# Patient Record
Sex: MALE | Race: WHITE | NOT HISPANIC OR LATINO | Employment: FULL TIME | URBAN - METROPOLITAN AREA
[De-identification: names, ages, dates, MRNs, and addresses within clinical notes are randomized per-mention and may not be internally consistent; named-entity substitution may affect disease eponyms.]

---

## 2017-02-06 ENCOUNTER — MYC MEDICAL ADVICE (OUTPATIENT)
Dept: FAMILY MEDICINE | Facility: CLINIC | Age: 57
End: 2017-02-06

## 2017-02-06 NOTE — TELEPHONE ENCOUNTER
From: Reid Monaco  To: Caro Tinoco PA  Sent: 2/6/2017 9:27 AM CST  Subject: Bradycardia 03/27/2014    Hello     Looking at my Current Health Summary, bradycardia appears as of March 27, 2014. I suspect this is a mistake. I've never been told of this diagnoses and have no history of bradycardia to my knowledge.     My wife, Evelina, does have arrythmia, so it makes me suspect her diagnoses was put on my chart.     Can you please tell me how to resolve this?     Thank you,  Carlton Monaco  [phone]

## 2017-02-08 ENCOUNTER — OFFICE VISIT (OUTPATIENT)
Dept: FAMILY MEDICINE | Facility: CLINIC | Age: 57
End: 2017-02-08

## 2017-02-08 VITALS
HEART RATE: 54 BPM | TEMPERATURE: 97.8 F | WEIGHT: 177 LBS | HEIGHT: 69 IN | BODY MASS INDEX: 26.22 KG/M2 | RESPIRATION RATE: 16 BRPM | SYSTOLIC BLOOD PRESSURE: 104 MMHG | DIASTOLIC BLOOD PRESSURE: 68 MMHG | OXYGEN SATURATION: 99 %

## 2017-02-08 DIAGNOSIS — Z11.59 NEED FOR HEPATITIS C SCREENING TEST: Primary | ICD-10-CM

## 2017-02-08 PROCEDURE — 36415 COLL VENOUS BLD VENIPUNCTURE: CPT | Performed by: FAMILY MEDICINE

## 2017-02-08 PROCEDURE — 99212 OFFICE O/P EST SF 10 MIN: CPT | Performed by: FAMILY MEDICINE

## 2017-02-08 PROCEDURE — 86803 HEPATITIS C AB TEST: CPT | Mod: 90 | Performed by: FAMILY MEDICINE

## 2017-02-08 NOTE — NURSING NOTE
Reid Monaco is here today for his Hepatitis C Screening per Emma.    Pre-Visit Screening :  Immunizations : up to date (Declines Flu Shot)  Colon Screening : is up to date  Asthma Action Test/Plan : NA  PHQ9/GAD7 :  NA  BP done on the right arm, with a large sized cuff.  Pulse - regular  My Chart - accepts    CLASSIFICATION OF OVERWEIGHT AND OBESITY BY BMI                         Obesity Class           BMI(kg/m2)  Underweight                                    < 18.5  Normal                                         18.5-24.9  Overweight                                     25.0-29.9  OBESITY                     I                  30.0-34.9                              II                 35.0-39.9  EXTREME OBESITY             III                >40                             Patient's  BMI Body mass index is 26.13 kg/(m^2).  http://hin.nhlbi.nih.gov/menuplanner/menu.cgi  Questioned patient about current smoking habits.  Pt. quit smoking some time ago.  Cinthya Meier, CMA

## 2017-02-08 NOTE — PROGRESS NOTES
SUBJECTIVE: 56 year old male complaining of seeing he has not had a hepatitis C screening. Has been reading about this recommendation.   The patient describes no risk factors for this infection.   The patient denies a history of hepatitis symptoms in the past.   Smoking history: No.   Relevant past medical history: positive for elevated triglycerides well controlled with medications.    OBJECTIVE: The patient appears healthy, alert and no distress.   Spent time reviewing this recommendation, treatment plans, etc.    ASSESSMENT: (Z11.59) Need for hepatitis C screening test  (primary encounter diagnosis)  Plan: Hepatits C antibody ([facility]), VENOUS COLLECTION        Await results.

## 2017-02-09 LAB
HCV AB - QUEST: NORMAL
SIGNAL TO CUT OFF - QUEST: 0.13

## 2017-03-14 DIAGNOSIS — E78.1 HYPERTRIGLYCERIDEMIA: ICD-10-CM

## 2017-03-14 RX ORDER — FENOFIBRATE 160 MG/1
TABLET ORAL
Qty: 30 TABLET | Refills: 0 | Status: SHIPPED | OUTPATIENT
Start: 2017-03-14 | End: 2017-03-27

## 2017-03-14 NOTE — TELEPHONE ENCOUNTER
Pending Prescriptions:                       Disp   Refills    fenofibrate 160 MG tablet [Pharmacy Med N*30 tab*0            Sig: TAKE 1 TABLET BY MOUTH ONCE DAILY    Pt is due for fasting yearly ov  Please fax 30 and send to [facility]  [phone] (home)

## 2017-03-27 ENCOUNTER — OFFICE VISIT (OUTPATIENT)
Dept: FAMILY MEDICINE | Facility: CLINIC | Age: 57
End: 2017-03-27

## 2017-03-27 VITALS
OXYGEN SATURATION: 99 % | RESPIRATION RATE: 16 BRPM | HEIGHT: 69 IN | WEIGHT: 174 LBS | DIASTOLIC BLOOD PRESSURE: 72 MMHG | BODY MASS INDEX: 25.77 KG/M2 | TEMPERATURE: 98.2 F | HEART RATE: 76 BPM | SYSTOLIC BLOOD PRESSURE: 112 MMHG

## 2017-03-27 DIAGNOSIS — Z76.0 ENCOUNTER FOR MEDICATION REFILL: ICD-10-CM

## 2017-03-27 DIAGNOSIS — E78.1 HYPERTRIGLYCERIDEMIA: Primary | ICD-10-CM

## 2017-03-27 DIAGNOSIS — K64.8 PROLAPSED HEMORRHOIDS: ICD-10-CM

## 2017-03-27 PROCEDURE — 36415 COLL VENOUS BLD VENIPUNCTURE: CPT | Performed by: FAMILY MEDICINE

## 2017-03-27 PROCEDURE — 80061 LIPID PANEL: CPT | Mod: 90 | Performed by: FAMILY MEDICINE

## 2017-03-27 PROCEDURE — 99214 OFFICE O/P EST MOD 30 MIN: CPT | Performed by: FAMILY MEDICINE

## 2017-03-27 PROCEDURE — 80053 COMPREHEN METABOLIC PANEL: CPT | Mod: 90 | Performed by: FAMILY MEDICINE

## 2017-03-27 RX ORDER — FENOFIBRATE 160 MG/1
160 TABLET ORAL DAILY
Qty: 90 TABLET | Refills: 3 | Status: SHIPPED | OUTPATIENT
Start: 2017-03-27 | End: 2018-04-03

## 2017-03-27 NOTE — MR AVS SNAPSHOT
After Visit Summary   3/27/2017    Reid Monaco    MRN: [id number]           Patient Information     Date Of Birth          1960        Visit Information        Provider Department      3/27/2017 7:20 AM Shelly Flores MD [facility]        Today's Diagnoses     Hypertriglyceridemia    -  1    Prolapsed hemorrhoids        Encounter for medication refill          Care Instructions    1)  Medication: continue current medication regimen unchanged  2)  Low fat, low cholesterol diet  3)  Regular aerobic exercise  4)  Recheck in 1 year, sooner should new symptoms or   problems arise.    Patient Education: Reviewed risks of elevated lipids and principles   of treatment.            Follow-ups after your visit        Additional Services     COLORECTAL SURGERY REFERRAL       Your provider has referred you to: [facility] [place] [phone]   [email or web address]    Referral Reason(s): Hemorrhoids  Special Concerns: None  This referral is: Elective (week +)  It is OK to leave a message on patient's voicemail.    Please be aware that coverage of these services is subject to the terms and limitations of your health insurance plan.  Call member services at your health plan with any benefit or coverage questions.      Please bring the following with you to your appointment:    (1) Any X-Rays, CTs or MRIs which have been performed.  Contact the facility where they were done to arrange for  prior to your scheduled appointment.    (2) List of current medications  (3) This referral request   (4) Any documents/labs given to you for this referral                  Follow-up notes from your care team     Return in about 1 year (around 3/27/2018).      Who to contact     If you have questions or need follow up information about today's clinic visit or your schedule please contact [facility] directly at  "[phone].  Normal or non-critical lab and imaging results will be communicated to you by MyChart, letter or phone within 4 business days after the clinic has received the results. If you do not hear from us within 7 days, please contact the clinic through V[employer] or phone. If you have a critical or abnormal lab result, we will notify you by phone as soon as possible.  Submit refill requests through [employer] or call your pharmacy and they will forward the refill request to us. Please allow 3 business days for your refill to be completed.          Additional Information About Your Visit        [facility] Information     [employer] gives you secure access to your electronic health record. If you see a primary care provider, you can also send messages to your care team and make appointments. If you have questions, please call your primary care clinic.  If you do not have a primary care provider, please call [phone] and they will assist you.        Care EveryWhere ID     This is your Care EveryWhere ID. This could be used by other organizations to access your [facility] medical records  [id number]        Your Vitals Were     Pulse Temperature Respirations Height Pulse Oximetry BMI (Body Mass Index)    76 98.2  F (36.8  C) (Oral) 16 1.753 m (5' 9\") 99% 25.7 kg/m2       Blood Pressure from Last 3 Encounters:   03/27/17 112/72   02/08/17 104/68   03/26/16 122/78    Weight from Last 3 Encounters:   03/27/17 78.9 kg (174 lb)   02/08/17 80.3 kg (177 lb)   03/26/16 94.7 kg (208 lb 12.8 oz)              We Performed the Following     COLORECTAL SURGERY REFERRAL     COMPREHENSIVE METABOLIC PANEL     LIPID PANEL     VENIPUNC FNGR,HEEL,EAR [14552]          Today's Medication Changes          These changes are accurate as of: 3/27/17  7:44 AM.  If you have any questions, ask your nurse or doctor.               These medicines have changed or have updated prescriptions.        Dose/Directions    fenofibrate 160 MG tablet   This may " have changed:  See the new instructions.   Used for:  Hypertriglyceridemia   Changed by:  Shelly Flores MD        Dose:  160 mg   Take 1 tablet (160 mg) by mouth daily   Quantity:  90 tablet   Refills:  3            Where to get your medicines      These medications were sent to [id number] - [place], [place] - [address] AT [place] [address]  [address], [place] [address]     Phone:  [phone]     fenofibrate 160 MG tablet                Primary Care Provider Office Phone # Fax #    Shelly Flores [phone]       [facility] [address]  [facility] [address]        Thank you!     Thank you for choosing [facility]  for your care. Our goal is always to provide you with excellent care. Hearing back from our patients is one way we can continue to improve our services. Please take a few minutes to complete the written survey that you may receive in the mail after your visit with us. Thank you!             Your Updated Medication List - Protect others around you: Learn how to safely use, store and throw away your medicines at www.disposemymeds.org.          This list is accurate as of: 3/27/17  7:44 AM.  Always use your most recent med list.                   Brand Name Dispense Instructions for use    aspirin 81 MG tablet      1 TABLET DAILY       fenofibrate 160 MG tablet     90 tablet    Take 1 tablet (160 mg) by mouth daily

## 2017-03-27 NOTE — PROGRESS NOTES
"SUBJECTIVE:  Reid Monaco is an 57 year old male who presents for evaluation of   hyperlipidemia. He has been diagnosed in the past as having   elevated triglycerides only. He indicates that he is feeling well   and denies any symptoms of cardiovascular disease. Specifically   denies chest pain, palpitations, dyspnea, orthopnea, PND,   claudication or peripheral edema. Treatment modalities employed to   this point include diet, regular aerobic exercise and TRICOR. Current medication   regimen is as listed below. Patient denies any side effects of   medication.    Family history: positive for hypertriglyceridemia  Age at diagnosis of hyperlipidemia: 45  Cardiovascular risk factors: family history, lipids and sedentary life style    ROS; longstanding internal hemorrhoidal tissue now prolapsed/ requests referral for removal    Current Outpatient Prescriptions   Medication     fenofibrate 160 MG tablet     ASPIRIN 81 MG OR TABS     No current facility-administered medications for this visit.      No Known Allergies    Social History   Substance Use Topics     Smoking status: Former Smoker     Packs/day: 0.50     Years: 5.00     Smokeless tobacco: Never Used      Comment: quit age 28     Alcohol use 1.0 oz/week     2 drink(s) per week       OBJECTIVE:  /72 (BP Location: Left arm, Patient Position: Chair, Cuff Size: Adult Large)  Pulse 76  Temp 98.2  F (36.8  C) (Oral)  Resp 16  Ht 1.753 m (5' 9\")  Wt 78.9 kg (174 lb)  SpO2 99%  BMI 25.7 kg/m2  Repeat BP R arm seated = 112/72  with regular size cuff.  Skin: negative  Fundi: deferred  Lungs: negative, Percussion normal. Good diaphragmatic excursion. Lungs clear  Heart: negative, PMI normal. No lifts, heaves, or thrills. RRR. No murmurs, clicks gallops or rub  Peripheral pulses: radial=4/4, femoral=4/4, popliteal=4/4, dorsalis pedis=4/4,  Abd; The abdomen is soft without tenderness, guarding, mass or organomegaly. Bowel sounds are normal. No CVA tenderness " or inguinal adenopathy noted.  BMI : Body mass index is 25.7 kg/(m^2).    ASSESSMENT:  (E78.1) Hypertriglyceridemia  (primary encounter diagnosis)  Plan: VENIPUNC FNGR,HEEL,EAR [85794], LIPID PANEL,         COMPREHENSIVE METABOLIC PANEL, fenofibrate 160         MG tablet        1)  Medication: continue current medication regimen unchanged  2)  Low fat, low cholesterol diet  3)  Regular aerobic exercise  4)  Recheck in 1 year, sooner should new symptoms or   problems arise.    Patient Education: Reviewed risks of elevated lipids and principles   of treatment.        (Z76.0) Encounter for medication refill  Plan: VENIPUNC FNGR,HEEL,EAR [14022], LIPID PANEL,         COMPREHENSIVE METABOLIC PANEL            (K64.8) Prolapsed hemorrhoids  Plan: COLORECTAL SURGERY REFERRAL        Schedule consultation

## 2017-03-27 NOTE — PATIENT INSTRUCTIONS
1)  Medication: continue current medication regimen unchanged  2)  Low fat, low cholesterol diet  3)  Regular aerobic exercise  4)  Recheck in 1 year, sooner should new symptoms or   problems arise.    Patient Education: Reviewed risks of elevated lipids and principles   of treatment.

## 2017-03-27 NOTE — NURSING NOTE
Patient is here for a recheck of their medication.  Pre-Visit Screening :  Immunizations : up to date    Colonoscopy : is up to date  Mammogram : na  Asthma Action Test/Plan : na  PHQ9/GAD7 :  na  Pulse - regular    Medication Reconciliation: complete      CLASSIFICATION OF OVERWEIGHT AND OBESITY BY BMI                         Obesity Class           BMI(kg/m2)  Underweight                                    < 18.5  Normal                                         18.5-24.9  Overweight                                     25.0-29.9  OBESITY                     I                  30.0-34.9                              II                 35.0-39.9  EXTREME OBESITY             III                >40                             Patient's  BMI Body mass index is 25.7 kg/(m^2).  http://hin.nhlbi.nih.gov/menuplanner/menu.cgi  Questioned patient about current smoking habits.  Pt. quit smoking some time ago.

## 2017-03-28 LAB
ALBUMIN SERPL-MCNC: 4.3 G/DL (ref 3.6–5.1)
ALBUMIN/GLOB SERPL: 1.7 (CALC) (ref 1–2.5)
ALP SERPL-CCNC: 41 U/L (ref 40–115)
ALT SERPL-CCNC: 15 U/L (ref 9–46)
AST SERPL-CCNC: 18 U/L (ref 10–35)
BILIRUB SERPL-MCNC: 0.5 MG/DL (ref 0.2–1.2)
BUN SERPL-MCNC: 15 MG/DL (ref 7–25)
BUN/CREATININE RATIO: NORMAL (CALC) (ref 6–22)
CALCIUM SERPL-MCNC: 9.3 MG/DL (ref 8.6–10.3)
CHLORIDE SERPLBLD-SCNC: 107 MMOL/L (ref 98–110)
CHOLEST SERPL-MCNC: 139 MG/DL (ref 125–200)
CHOLEST/HDLC SERPL: 2.8 (CALC)
CO2 SERPL-SCNC: 27 MMOL/L (ref 20–31)
CREAT SERPL-MCNC: 0.96 MG/DL (ref 0.7–1.33)
EGFR AFRICAN AMERICAN - QUEST: 101 ML/MIN/1.73M2
GFR SERPL CREATININE-BSD FRML MDRD: 87 ML/MIN/1.73M2
GLOBULIN, CALCULATED - QUEST: 2.5 G/DL (CALC) (ref 1.9–3.7)
GLUCOSE - QUEST: 93 MG/DL (ref 65–99)
HDLC SERPL-MCNC: 49 MG/DL
LDLC SERPL CALC-MCNC: 75 MG/DL (CALC)
NONHDLC SERPL-MCNC: 90 MG/DL (CALC)
POTASSIUM SERPL-SCNC: 4.3 MMOL/L (ref 3.5–5.3)
PROT SERPL-MCNC: 6.8 G/DL (ref 6.1–8.1)
SODIUM SERPL-SCNC: 141 MMOL/L (ref 135–146)
TRIGL SERPL-MCNC: 76 MG/DL

## 2017-04-20 ENCOUNTER — MYC MEDICAL ADVICE (OUTPATIENT)
Dept: FAMILY MEDICINE | Facility: CLINIC | Age: 57
End: 2017-04-20

## 2017-04-20 DIAGNOSIS — Z86.0100 HISTORY OF COLONIC POLYPS: Primary | ICD-10-CM

## 2017-04-20 NOTE — TELEPHONE ENCOUNTER
From: Reid Monaco  To: Shelly Flores MD  Sent: 4/20/2017 10:54 AM CDT  Subject: Medical history    Hello -     I'm just wondering how to get my medical history updated in MyChart?     I had a colonoscopy Nov 5, 2015, that is not shown in my medical history. Can that be added?     The provider was [facility] in [place]. One benign polyp was removed.     Thank you!  Carlton Monaco

## 2017-05-01 ENCOUNTER — HOSPITAL PATHOLOGY (OUTPATIENT)
Dept: OTHER | Facility: CLINIC | Age: 57
End: 2017-05-01

## 2017-05-01 ENCOUNTER — TRANSFERRED RECORDS (OUTPATIENT)
Dept: FAMILY MEDICINE | Facility: CLINIC | Age: 57
End: 2017-05-01

## 2017-05-03 LAB — COPATH REPORT: NORMAL

## 2017-06-19 ENCOUNTER — TRANSFERRED RECORDS (OUTPATIENT)
Dept: FAMILY MEDICINE | Facility: CLINIC | Age: 57
End: 2017-06-19

## 2018-03-30 ENCOUNTER — OFFICE VISIT (OUTPATIENT)
Dept: FAMILY MEDICINE | Facility: CLINIC | Age: 58
End: 2018-03-30
Payer: COMMERCIAL

## 2018-03-30 VITALS
SYSTOLIC BLOOD PRESSURE: 124 MMHG | BODY MASS INDEX: 28.82 KG/M2 | HEIGHT: 69 IN | WEIGHT: 194.6 LBS | OXYGEN SATURATION: 98 % | TEMPERATURE: 98 F | HEART RATE: 81 BPM | DIASTOLIC BLOOD PRESSURE: 74 MMHG

## 2018-03-30 DIAGNOSIS — Z13.1 SCREENING FOR DIABETES MELLITUS: ICD-10-CM

## 2018-03-30 DIAGNOSIS — Z00.01 ENCOUNTER FOR ROUTINE ADULT MEDICAL EXAM WITH ABNORMAL FINDINGS: ICD-10-CM

## 2018-03-30 DIAGNOSIS — Z12.5 SCREENING FOR PROSTATE CANCER: ICD-10-CM

## 2018-03-30 DIAGNOSIS — E78.1 HYPERTRIGLYCERIDEMIA: Primary | ICD-10-CM

## 2018-03-30 DIAGNOSIS — Z23 NEED FOR TDAP VACCINATION: ICD-10-CM

## 2018-03-30 LAB
ALBUMIN SERPL-MCNC: 4.1 G/DL (ref 3.4–5)
ALP SERPL-CCNC: 40 U/L (ref 40–150)
ALT SERPL W P-5'-P-CCNC: 32 U/L (ref 0–70)
ANION GAP SERPL CALCULATED.3IONS-SCNC: 3 MMOL/L (ref 3–14)
AST SERPL W P-5'-P-CCNC: 30 U/L (ref 0–45)
BILIRUB SERPL-MCNC: 0.5 MG/DL (ref 0.2–1.3)
BUN SERPL-MCNC: 18 MG/DL (ref 7–30)
CALCIUM SERPL-MCNC: 9.2 MG/DL (ref 8.5–10.1)
CHLORIDE SERPL-SCNC: 107 MMOL/L (ref 94–109)
CHOLEST SERPL-MCNC: 202 MG/DL
CO2 SERPL-SCNC: 31 MMOL/L (ref 20–32)
CREAT SERPL-MCNC: 1.03 MG/DL (ref 0.66–1.25)
GFR SERPL CREATININE-BSD FRML MDRD: 74 ML/MIN/1.7M2
GLUCOSE SERPL-MCNC: 92 MG/DL (ref 70–99)
HBA1C MFR BLD: 5.1 % (ref 0–6.4)
HDLC SERPL-MCNC: 43 MG/DL
LDLC SERPL CALC-MCNC: 124 MG/DL
NONHDLC SERPL-MCNC: 159 MG/DL
POTASSIUM SERPL-SCNC: 4.9 MMOL/L (ref 3.4–5.3)
PROT SERPL-MCNC: 7.4 G/DL (ref 6.8–8.8)
PSA SERPL-ACNC: 1.38 UG/L (ref 0–4)
SODIUM SERPL-SCNC: 141 MMOL/L (ref 133–144)
TRIGL SERPL-MCNC: 176 MG/DL

## 2018-03-30 PROCEDURE — 80053 COMPREHEN METABOLIC PANEL: CPT | Performed by: FAMILY MEDICINE

## 2018-03-30 PROCEDURE — 80061 LIPID PANEL: CPT | Performed by: FAMILY MEDICINE

## 2018-03-30 PROCEDURE — 99396 PREV VISIT EST AGE 40-64: CPT | Mod: 25 | Performed by: FAMILY MEDICINE

## 2018-03-30 PROCEDURE — G0103 PSA SCREENING: HCPCS | Performed by: FAMILY MEDICINE

## 2018-03-30 PROCEDURE — 90715 TDAP VACCINE 7 YRS/> IM: CPT | Performed by: FAMILY MEDICINE

## 2018-03-30 PROCEDURE — 36415 COLL VENOUS BLD VENIPUNCTURE: CPT | Performed by: FAMILY MEDICINE

## 2018-03-30 PROCEDURE — 90471 IMMUNIZATION ADMIN: CPT | Performed by: FAMILY MEDICINE

## 2018-03-30 PROCEDURE — 83036 HEMOGLOBIN GLYCOSYLATED A1C: CPT | Performed by: FAMILY MEDICINE

## 2018-03-30 NOTE — NURSING NOTE
"Chief Complaint   Patient presents with     Physical       Initial /74  Pulse 81  Temp 98  F (36.7  C) (Oral)  Ht 5' 9\" (1.753 m)  Wt 194 lb 9.6 oz (88.3 kg)  SpO2 98%  BMI 28.74 kg/m2 Estimated body mass index is 28.74 kg/(m^2) as calculated from the following:    Height as of this encounter: 5' 9\" (1.753 m).    Weight as of this encounter: 194 lb 9.6 oz (88.3 kg).  Medication Reconciliation: complete   Jenifer Fatima Certified Medical Assistant    "

## 2018-03-30 NOTE — PROGRESS NOTES
SUBJECTIVE:   CC: Reid Monaco is an 58 year old male who presents for preventative health visit.     Physical   Annual:     Getting at least 3 servings of Calcium per day::  Yes    Bi-annual eye exam::  Yes    Dental care twice a year::  Yes    Sleep apnea or symptoms of sleep apnea::  None    Diet::  Regular (no restrictions)    Frequency of exercise::  6-7 days/week    Duration of exercise::  45-60 minutes    Taking medications regularly::  Yes    Medication side effects::  None    Additional concerns today::  No          He is taking fenofibrate for hypertriglyceridemia.  He also takes a baby aspirin.  Denies any other concerns today.      Today's PHQ-2 Score: Answers for HPI/ROS submitted by the patient on 3/28/2018   PHQ-2 Score: 0    PHQ-2 ( 1999 Pfizer) 3/30/2018   Q1: Little interest or pleasure in doing things 0   Q2: Feeling down, depressed or hopeless 0   PHQ-2 Score 0   Q1: Little interest or pleasure in doing things -   Q2: Feeling down, depressed or hopeless -   PHQ-2 Score -       Abuse: Current or Past(Physical, Sexual or Emotional)- No  Do you feel safe in your environment - Yes    Social History   Substance Use Topics     Smoking status: Former Smoker     Packs/day: 0.50     Years: 5.00     Smokeless tobacco: Never Used      Comment: quit age 28     Alcohol use 1.0 oz/week     2 drink(s) per week     Alcohol Use 3/28/2018   If you drink alcohol do you typically have greater than 3 drinks per day OR greater than 7 drinks per week? No     AUDIT - Alcohol Use Disorders Identification Test - Reproduced from the World Health Organization Audit 2001 (Second Edition) 3/30/2018   1.  How often do you have a drink containing alcohol? 2 to 3 times a week       Last PSA:   [facility] PSA   Date Value Ref Range Status   03/27/2014 0.6 < OR = 4.0 ng/mL Final     Comment:        This test was performed using the Siemens  chemiluminescent method. Values obtained from  different assay methods cannot be  "used  interchangeably. PSA levels, regardless of  value, should not be interpreted as absolute  evidence of the presence or absence of disease.          Reviewed orders with patient. Reviewed health maintenance and updated orders accordingly - Yes  BP Readings from Last 3 Encounters:   03/30/18 124/74   03/27/17 112/72   02/08/17 104/68    Wt Readings from Last 3 Encounters:   03/30/18 194 lb 9.6 oz (88.3 kg)   03/27/17 174 lb (78.9 kg)   02/08/17 177 lb (80.3 kg)            Reviewed and updated as needed this visit by clinical staff  Tobacco  Allergies  Meds  Med Hx  Surg Hx  Fam Hx  Soc Hx        Reviewed and updated as needed this visit by Provider        Past Medical History:   Diagnosis Date     Family history of GI malignancy 6/29/2010    Father - colon cancer 61      History of colonic polyps 4/20/2017     Hypertriglyceridemia 5/14/2009      Past Surgical History:   Procedure Laterality Date     HC COLONOSCOPY THRU STOMA, DIAGNOSTIC  2010    normal     VASECTOMY  10/2008       Review of Systems  C: NEGATIVE for fever, chills, change in weight  I: NEGATIVE for worrisome rashes, moles or lesions  E: NEGATIVE for vision changes or irritation  ENT: NEGATIVE for ear, mouth and throat problems  R: NEGATIVE for significant cough or SOB  CV: NEGATIVE for chest pain, palpitations or peripheral edema  GI: NEGATIVE for nausea, abdominal pain, heartburn, or change in bowel habits   male: negative for dysuria, hematuria, decreased urinary stream, erectile dysfunction, urethral discharge  M: NEGATIVE for significant arthralgias or myalgia  N: NEGATIVE for weakness, dizziness or paresthesias  P: NEGATIVE for changes in mood or affect    OBJECTIVE:   /74  Pulse 81  Temp 98  F (36.7  C) (Oral)  Ht 5' 9\" (1.753 m)  Wt 194 lb 9.6 oz (88.3 kg)  SpO2 98%  BMI 28.74 kg/m2    Physical Exam  GENERAL: healthy, alert and no distress  EYES: Eyes grossly normal to inspection, PERRL and conjunctivae and sclerae " normal  HENT: ear canals and TM's normal, nose and mouth without ulcers or lesions  NECK: no adenopathy and no asymmetry, masses, or scars  RESP: lungs clear to auscultation - no rales, rhonchi or wheezes  CV: regular rate and rhythm, normal S1 S2, no S3 or S4, no murmur, click or rub, no peripheral edema and peripheral pulses strong  ABDOMEN: soft, nontender, no hepatosplenomegaly, no masses and bowel sounds normal  MS: no gross musculoskeletal defects noted, no edema  SKIN: no suspicious lesions or rashes    ASSESSMENT/PLAN:   1. Hypertriglyceridemia: currently taking fenofibrate. Recheck lipids today.  - Lipid panel reflex to direct LDL Fasting    2. Screening for prostate cancer: Discussed controversies surrounding PSA. Specifically reviewed 2017 USPSTF findings recommending discussion of PSA testing for men ages 55-69.  Reviewed findings of the  Randomized Study of Screening for Prostate Cancer which showed a 30% reduction in advanced stage prostate cancer and a 20% reduction in death rate from prostate cancer in this age group. PSA-based screening may prevent up to 2 deaths and up to 3 cases of metastatic disease per 1,000 men screened over 13 years. We've elected to do PSA this year after discussing these controversies.  - PSA, screen    3. Screening for diabetes mellitus  - Hemoglobin A1c    4. Encounter for routine adult medical exam with abnormal findings  - Comprehensive metabolic panel    5. Need for Tdap vaccination  - TDAP VACCINE (ADACEL)      COUNSELING:   Reviewed preventive health counseling, as reflected in patient instructions       Regular exercise       Healthy diet/nutrition       Prostate cancer screening    BP Screening:   Last 3 BP Readings:    BP Readings from Last 3 Encounters:   03/30/18 124/74   03/27/17 112/72   02/08/17 104/68       The following was recommended to the patient:  Re-screen BP within a year and recommended lifestyle modifications     reports that he has quit  "smoking. He has a 2.50 pack-year smoking history. He has never used smokeless tobacco.    Estimated body mass index is 25.7 kg/(m^2) as calculated from the following:    Height as of 3/27/17: 5' 9\" (1.753 m).    Weight as of 3/27/17: 174 lb (78.9 kg).   Weight management plan: Discussed healthy diet and exercise guidelines and patient will follow up in 12 months in clinic to re-evaluate.    Counseling Resources:  ATP IV Guidelines  Pooled Cohorts Equation Calculator  FRAX Risk Assessment  ICSI Preventive Guidelines  Dietary Guidelines for Americans, 2010  USDA's MyPlate  ASA Prophylaxis  Lung CA Screening    Fady Castro, DO  [facility]  "

## 2018-03-30 NOTE — MR AVS SNAPSHOT
After Visit Summary   3/30/2018    Reid Monaco    MRN: [id number]           Patient Information     Date Of Birth          1960        Visit Information        Provider Department      3/30/2018 8:20 AM Fady Castro,  [facility]        Today's Diagnoses     Hypertriglyceridemia    -  1    Screening for prostate cancer        Screening for diabetes mellitus        Encounter for routine adult medical exam with abnormal findings          Care Instructions      Preventive Health Recommendations  Male Ages 50 - 64    Yearly exam:             See your health care provider every year in order to  o   Review health changes.   o   Discuss preventive care.    o   Review your medicines if your doctor has prescribed any.     Have a cholesterol test every 5 years, or more frequently if you are at risk for high cholesterol/heart disease.     Have a diabetes test (fasting glucose) every three years. If you are at risk for diabetes, you should have this test more often.     Have a colonoscopy at age 50, or have a yearly FIT test (stool test). These exams will check for colon cancer.      Talk with your health care provider about whether or not a prostate cancer screening test (PSA) is right for you.    You should be tested each year for STDs (sexually transmitted diseases), if you re at risk.     Shots: Get a flu shot each year. Get a tetanus shot every 10 years.     Nutrition:    Eat at least 5 servings of fruits and vegetables daily.     Eat whole-grain bread, whole-wheat pasta and brown rice instead of white grains and rice.     Talk to your provider about Calcium and Vitamin D.     Lifestyle    Exercise for at least 150 minutes a week (30 minutes a day, 5 days a week). This will help you control your weight and prevent disease.     Limit alcohol to one drink per day.     No smoking.     Wear sunscreen to prevent skin cancer.     See your dentist every six months for an exam and  "cleaning.     See your eye doctor every 1 to 2 years.            Follow-ups after your visit        Who to contact     If you have questions or need follow up information about today's clinic visit or your schedule please contact [facility] directly at [phone].  Normal or non-critical lab and imaging results will be communicated to you by MyChart, letter or phone within 4 business days after the clinic has received the results. If you do not hear from us within 7 days, please contact the clinic through [employer] or phone. If you have a critical or abnormal lab result, we will notify you by phone as soon as possible.  Submit refill requests through [employer] or call your pharmacy and they will forward the refill request to us. Please allow 3 business days for your refill to be completed.          Additional Information About Your Visit        MyChart Information     [employer] gives you secure access to your electronic health record. If you see a primary care provider, you can also send messages to your care team and make appointments. If you have questions, please call your primary care clinic.  If you do not have a primary care provider, please call [phone] and they will assist you.        Care EveryWhere ID     This is your Care EveryWhere ID. This could be used by other organizations to access your [place] medical records  [id number]        Your Vitals Were     Pulse Temperature Height Pulse Oximetry BMI (Body Mass Index)       81 98  F (36.7  C) (Oral) 5' 9\" (1.753 m) 98% 28.74 kg/m2        Blood Pressure from Last 3 Encounters:   03/30/18 124/74   03/27/17 112/72   02/08/17 104/68    Weight from Last 3 Encounters:   03/30/18 194 lb 9.6 oz (88.3 kg)   03/27/17 174 lb (78.9 kg)   02/08/17 177 lb (80.3 kg)              We Performed the Following     Comprehensive metabolic panel     Hemoglobin A1c     Lipid panel reflex to direct LDL Fasting     PSA, screen        Primary Care Provider Office " Phone # Fax #    Shelly Flores [phone] [address] [address]        Equal Access to Services     JC STEVENS : Hadii cale ku gerao Sotrevonali, waaxda luqadaha, qaybta kaalmada adedevangda, latoya abernathyanya radhika. So [facility] [phone].    ATENCIÓN: Si habla español, tiene a flores disposición servicios gratuitos de asistencia lingüística. Llame al [phone].    We comply with applicable federal civil rights laws and [place] laws. We do not discriminate on the basis of race, color, national origin, age, disability, sex, sexual orientation, or gender identity.            Thank you!     Thank you for choosing [facility]  for your care. Our goal is always to provide you with excellent care. Hearing back from our patients is one way we can continue to improve our services. Please take a few minutes to complete the written survey that you may receive in the mail after your visit with us. Thank you!             Your Updated Medication List - Protect others around you: Learn how to safely use, store and throw away your medicines at www.disposemymeds.org.          This list is accurate as of 3/30/18  9:00 AM.  Always use your most recent med list.                   Brand Name Dispense Instructions for use Diagnosis    aspirin 81 MG tablet      1 TABLET DAILY    Other general medical examination for administrative purposes       fenofibrate 160 MG tablet     90 tablet    Take 1 tablet (160 mg) by mouth daily    Hypertriglyceridemia

## 2018-04-02 ENCOUNTER — MYC MEDICAL ADVICE (OUTPATIENT)
Dept: FAMILY MEDICINE | Facility: CLINIC | Age: 58
End: 2018-04-02

## 2018-04-02 DIAGNOSIS — E78.1 HYPERTRIGLYCERIDEMIA: ICD-10-CM

## 2018-04-03 RX ORDER — FENOFIBRATE 160 MG/1
160 TABLET ORAL DAILY
Qty: 90 TABLET | Refills: 3 | Status: SHIPPED | OUTPATIENT
Start: 2018-04-03 | End: 2019-04-03

## 2018-04-03 NOTE — TELEPHONE ENCOUNTER
Rx reordered and sent to pharmacy. Patient notified.  Amira Zuleta RN, BSN  [facility]

## 2019-01-22 ENCOUNTER — OFFICE VISIT (OUTPATIENT)
Dept: FAMILY MEDICINE | Facility: CLINIC | Age: 59
End: 2019-01-22
Payer: COMMERCIAL

## 2019-01-22 VITALS
HEIGHT: 69 IN | OXYGEN SATURATION: 98 % | HEART RATE: 73 BPM | DIASTOLIC BLOOD PRESSURE: 66 MMHG | WEIGHT: 202 LBS | SYSTOLIC BLOOD PRESSURE: 106 MMHG | TEMPERATURE: 97.9 F | BODY MASS INDEX: 29.92 KG/M2

## 2019-01-22 DIAGNOSIS — G89.29 CHRONIC RIGHT SHOULDER PAIN: Primary | ICD-10-CM

## 2019-01-22 DIAGNOSIS — M25.511 CHRONIC RIGHT SHOULDER PAIN: Primary | ICD-10-CM

## 2019-01-22 PROCEDURE — 99213 OFFICE O/P EST LOW 20 MIN: CPT | Performed by: FAMILY MEDICINE

## 2019-01-22 ASSESSMENT — MIFFLIN-ST. JEOR: SCORE: 1726.65

## 2019-01-22 NOTE — PATIENT INSTRUCTIONS
1. Start with ice/heat as needed. Okay to use ibuprofen, acetaminophen as needed. If not improving or if worsening, let me know. We could then consider imaging (x-ray), referral to non-surgical orthopedics, or physical therapy.

## 2019-01-22 NOTE — PROGRESS NOTES
SUBJECTIVE:                                                    Reid Monaco is a 58 year old male who presents to clinic today for the following health issues:      Joint Pain    Onset: 6 month    Description:   Location: right shoulder  Character: Dull ache    Intensity: moderate    Progression of Symptoms: waxing and waning    Accompanying Signs & Symptoms:  Other symptoms: limited range of motion, some numbness    History:   Previous similar pain: no       Precipitating factors:   Trauma or overuse: no     Alleviating factors:  Improved by: nothing    Therapies Tried and outcome: none    Pain isn't so bad to need Tylenol or ibuprofen. He tries to favor the arm to avoid pain. Hasn't tried any ice or heat.     Problem list and histories reviewed & adjusted, as indicated.  Additional history: as documented    Patient Active Problem List   Diagnosis     Hypertriglyceridemia     Family history of GI malignancy     Obesity     Hemorrhoids, internal, with bleeding     Health Care Home     Bradycardia     ACP (advance care planning)     History of colonic polyps     Past Surgical History:   Procedure Laterality Date     HC COLONOSCOPY THRU STOMA, DIAGNOSTIC      normal     VASECTOMY  10/2008       Social History     Tobacco Use     Smoking status: Former Smoker     Packs/day: 0.50     Years: 5.00     Pack years: 2.50     Smokeless tobacco: Never Used     Tobacco comment: quit age 28   Substance Use Topics     Alcohol use: Yes     Alcohol/week: 1.0 oz     Types: 2 drink(s) per week     Family History   Problem Relation Age of Onset     Heart Disease Mother         tachycardia     Cancer - colorectal Father          age 61     Hypertension Sister      Hypertension Brother      Hyperlipidemia Sister      Hyperlipidemia Brother      C.A.D. No family hx of      Diabetes No family hx of            ROS:  Constitutional, HEENT, cardiovascular, pulmonary, gi and gu systems are negative, except as otherwise  "noted.    OBJECTIVE:     /66   Pulse 73   Temp 97.9  F (36.6  C) (Oral)   Ht 1.753 m (5' 9\")   Wt 91.6 kg (202 lb)   SpO2 98%   BMI 29.83 kg/m    Body mass index is 29.83 kg/m .  GENERAL: healthy, alert and no distress  MS: Neck with full range of motion without pain. Right shoulder with full range of motion. Positive pain with neers/osborne and crossover adduction. Negative empty can and speeds tests.    Diagnostic Test Results:  none     ASSESSMENT/PLAN:   1. Chronic right shoulder pain: suspect impingement or rotator cuff tendinopathy. He has not tried any sort of conservative management up to this point so well recommend acetaminophen/ibuprofen along with ice and/or heat. If still not improving could pursue XR of shoulder, physical therapy, or referral to sports medicine for further evaluation.    Fady Castro, DO  [facility]      "

## 2019-02-11 ENCOUNTER — ALLIED HEALTH/NURSE VISIT (OUTPATIENT)
Dept: NURSING | Facility: CLINIC | Age: 59
End: 2019-02-11
Payer: COMMERCIAL

## 2019-02-11 DIAGNOSIS — Z23 NEED FOR SHINGLES VACCINE: Primary | ICD-10-CM

## 2019-02-11 PROCEDURE — 90471 IMMUNIZATION ADMIN: CPT

## 2019-02-11 PROCEDURE — 90750 HZV VACC RECOMBINANT IM: CPT

## 2019-02-11 NOTE — PROGRESS NOTES
Questioned patient about current immunization status.  Immunizations Shingles need to be given.  Screening Questionnaire for Adult Immunization    Are you sick today?   No   Do you have allergies to medications, food, a vaccine component or latex?   No   Have you ever had a serious reaction after receiving a vaccination?   No   Do you have a long-term health problem with heart disease, lung disease, asthma, kidney disease, metabolic disease (e.g. diabetes), anemia, or other blood disorder?   No   Do you have cancer, leukemia, HIV/AIDS, or any other immune system problem?   No   In the past 3 months, have you taken medications that affect  your immune system, such as prednisone, other steroids, or anticancer drugs; drugs for the treatment of rheumatoid arthritis, Crohn s disease, or psoriasis; or have you had radiation treatments?   No   Have you had a seizure, or a brain or other nervous system problem?   No   During the past year, have you received a transfusion of blood or blood     products, or been given immune (gamma) globulin or antiviral drug?   No   For women: Are you pregnant or is there a chance you could become        pregnant during the next month?   No   Have you received any vaccinations in the past 4 weeks?   No     Immunization questionnaire answers were all negative.        Per orders of Dr. Castro, injection of Shingles given by Tata White. Patient instructed to remain in clinic for 15 minutes afterwards, and to report any adverse reaction to me immediately.    Pt states he did check with insurance on coverage clinic vs pharmacy. Clinic coverage was best.     Screening performed by Tata White on 2/11/2019 at 4:32 PM.

## 2019-02-11 NOTE — NURSING NOTE
Prior to injection, verified patient identity using patient's name and date of birth.  Due to injection administration, patient instructed to remain in clinic for 15 minutes  afterwards, and to report any adverse reaction to me immediately.    Shinmayeix   Pt states he did check with insurance on coverage clinic vs pharmacy. Clinic coverage was best.    Drug Amount Wasted:  None.  Vial/Syringe: Syringe  Expiration Date:  5/15/21  Letter sent to pt. Tata White CMA

## 2019-02-14 ENCOUNTER — ANCILLARY PROCEDURE (OUTPATIENT)
Dept: GENERAL RADIOLOGY | Facility: CLINIC | Age: 59
End: 2019-02-14
Attending: FAMILY MEDICINE
Payer: COMMERCIAL

## 2019-02-14 ENCOUNTER — OFFICE VISIT (OUTPATIENT)
Dept: ORTHOPEDICS | Facility: CLINIC | Age: 59
End: 2019-02-14
Payer: COMMERCIAL

## 2019-02-14 VITALS
WEIGHT: 202 LBS | BODY MASS INDEX: 29.92 KG/M2 | HEIGHT: 69 IN | SYSTOLIC BLOOD PRESSURE: 112 MMHG | DIASTOLIC BLOOD PRESSURE: 70 MMHG

## 2019-02-14 DIAGNOSIS — M25.511 PAIN IN JOINT OF RIGHT SHOULDER: ICD-10-CM

## 2019-02-14 DIAGNOSIS — M19.011 ARTHROSIS OF RIGHT ACROMIOCLAVICULAR JOINT: ICD-10-CM

## 2019-02-14 DIAGNOSIS — M25.511 PAIN IN JOINT OF RIGHT SHOULDER: Primary | ICD-10-CM

## 2019-02-14 DIAGNOSIS — M75.41 IMPINGEMENT SYNDROME OF RIGHT SHOULDER: ICD-10-CM

## 2019-02-14 DIAGNOSIS — M25.811 OS ACROMIALE, RIGHT: ICD-10-CM

## 2019-02-14 PROCEDURE — 99203 OFFICE O/P NEW LOW 30 MIN: CPT | Performed by: FAMILY MEDICINE

## 2019-02-14 PROCEDURE — 73030 X-RAY EXAM OF SHOULDER: CPT | Mod: RT

## 2019-02-14 ASSESSMENT — MIFFLIN-ST. JEOR: SCORE: 1726.65

## 2019-02-14 NOTE — PROGRESS NOTES
"ASSESSMENT & PLAN    1. Pain in joint of right shoulder    2. Os acromiale, right    3. Arthrosis of right acromioclavicular joint    4. Impingement syndrome of right shoulder      Activity modification as discussed - keep activity below your shoulder height and lift close to your body  Physical therapy: [facility] - [phone]  Reviewed xray - os acromiale which predisposes to rotator cuff tendonitis and impingement    Follow-up if not improving after 4-6 therapy sessions.  -----    SUBJECTIVE  Reid Monaco is a/an 58 year old Right handed male who is seen as a self referral for evaluation of right shoulder pain. The patient is seen by themselves.    Onset: 6+ month(s) ago. Reports insidious onset without acute precipitating event.  Location of Pain: right shoulder- diffuse  Rating of Pain at worst: 6/10  Rating of Pain Currently: 2/10  Worsened by: pull starting a , reaching into the back seat of the car, reaching overhead  Better with: resting arm at the side  Treatments tried: ibuprofen  Associated symptoms: weakness, limited motion   Orthopedic history: NO  Relevant surgical history: NO  Patient Social History: works as a     Patient's past medical, surgical, social, and family histories were reviewed today and no changes are noted.    REVIEW OF SYSTEMS:  10 point ROS is negative other than symptoms noted above in HPI, Past Medical History or as stated below  Constitutional: NEGATIVE for fever, chills, change in weight  Skin: NEGATIVE for worrisome rashes, moles or lesions  GI/: NEGATIVE for bowel or bladder changes  Neuro: NEGATIVE for weakness, dizziness or paresthesias    OBJECTIVE:  /70   Ht 1.753 m (5' 9\")   Wt 91.6 kg (202 lb)   BMI 29.83 kg/m     General: healthy, alert and in no distress  HEENT: no scleral icterus or conjunctival erythema  Skin: no suspicious lesions or rash. No jaundice.  CV: regular rhythm by palpation  Resp: normal " respiratory effort without conversational dyspnea   Psych: normal mood and affect  Gait: normal steady gait with appropriate coordination and balance  Neuro: normal light touch sensory exam of the bilateral upper extremities.    MSK:  RIGHT SHOULDER  Inspection:    no atrophy  Palpation:    Tender about the supraspinatus insertion and middle deltoid. Remainder of bony and tendinous landmarks are nontender.  Active Range of Motion:     Abduction 1650, FF 1650, , IR painful.    Strength:    Scapular plane abduction 5-/5,  ER 5/5, IR 5/5, biceps 5/5, triceps 5/5  Special Tests:    Positive: Koch' and supraspinatus (empty can)    Independent visualization of the below image:    SHOULDER RIGHT THREE OR MORE VIEWS  2/14/2019 3:17 PM      HISTORY: Pain in joint of right shoulder.     COMPARISON: None.                                                                      IMPRESSION: No acute bony abnormality or significant degenerative  changes. There is an os acromiale, seen only on the axillary view.  Distal acromial morphology is type II and humeral acromial distance  appears adequate.     MIL ELIAS MD    Patient's conditions were thoroughly discussed during today's visit with greater than 50% of the visit spent counseling the patient with total time spent face-to-face with the patient being 30 minutes.    Oscar Hernandez, DO [facility]

## 2019-02-19 ENCOUNTER — THERAPY VISIT (OUTPATIENT)
Dept: PHYSICAL THERAPY | Facility: CLINIC | Age: 59
End: 2019-02-19
Payer: COMMERCIAL

## 2019-02-19 DIAGNOSIS — M75.41 IMPINGEMENT SYNDROME OF RIGHT SHOULDER: ICD-10-CM

## 2019-02-19 DIAGNOSIS — M25.511 ACUTE PAIN OF RIGHT SHOULDER: ICD-10-CM

## 2019-02-19 DIAGNOSIS — M25.811 OS ACROMIALE, RIGHT: ICD-10-CM

## 2019-02-19 DIAGNOSIS — M25.511 PAIN IN JOINT OF RIGHT SHOULDER: ICD-10-CM

## 2019-02-19 DIAGNOSIS — M75.41 IMPINGEMENT SYNDROME OF SHOULDER REGION, RIGHT: ICD-10-CM

## 2019-02-19 DIAGNOSIS — M19.011 ARTHROSIS OF RIGHT ACROMIOCLAVICULAR JOINT: ICD-10-CM

## 2019-02-19 PROCEDURE — 97110 THERAPEUTIC EXERCISES: CPT | Mod: GP | Performed by: PHYSICAL THERAPIST

## 2019-02-19 PROCEDURE — 97161 PT EVAL LOW COMPLEX 20 MIN: CPT | Mod: GP | Performed by: PHYSICAL THERAPIST

## 2019-02-19 NOTE — PROGRESS NOTES
[facility] Initial Evaluation  Subjective:  Onset of right shoulder pain 4 months ago of unknown etiology. Pt referred by MD for physical therapy on 2-14-19      The history is provided by the patient. No  was used.   Reid Monaco is a 59 year old male with a right shoulder condition.  Condition occurred with:  Unknown cause.  Condition occurred: for unknown reasons.  This is a new condition     Patient reports pain:  Anterior and posterior.  Radiates to:  Shoulder and upper arm.  Pain is described as sharp and is intermittent and reported as 7/10.  Associated symptoms:  Loss of motion/stiffness and loss of strength. Pain is worse in the A.M..  Symptoms are exacerbated by lying on extremity, lifting, using arm overhead and using arm behind back (dressing upper body, reaching behind seat in car) and relieved by NSAID's and rest.  Since onset symptoms are gradually worsening.  Special tests:  X-ray (see report).  Previous treatment: none.    General health as reported by patient is good.  Pertinent medical history includes:  Overweight.  Medical allergies: no.  Other surgeries include:  Orthopedic surgery (right meniscal surgery).  Medication history: fenofibrate.    Employment status: .  Employment tasks: computer work.    Barriers include:  None as reported by the patient.    Red flags:  None as reported by the patient.                        Objective:  Standing Alignment:      Shoulder/upper extremity deviations alignment: forward shoulder posture.                                       Shoulder Evaluation:  ROM:  AROM:    Flexion:  Right:  140  Extension: Right: 50  Abduction:  Right:  120    Internal Rotation:  Right:  70  External Rotation:  Right:  50                PROM:    Flexion:  Right: 150    Extension:  Right:  60  Abduction:  Right:  130      External Rotation:  Right:  60                    Strength:    Flexion: Right: 4+/5     Pain:    Extension:  Right: 5/5    Pain:  Abduction:  Right: 4/5   Weak/painful    Pain:  Adduction:  Right: 5/5     Pain:  Internal Rotation:  Right: 4+/5     Pain:  External Rotation:   Right:4/5     Pain:            Stability Testing:  normal      Special Tests:      Right shoulder positive for the following special tests:Impingement  Palpation:      Right shoulder tenderness present at: Supraspinatus                                     General     ROS    Assessment/Plan:    Patient is a 59 year old male with right side shoulder complaints.    Patient has the following significant findings with corresponding treatment plan.                Diagnosis 1:  Right shoulder impingement   Pain -  hot/cold therapy, manual therapy, self management, education and home program  Decreased ROM/flexibility - manual therapy, therapeutic exercise, therapeutic activity and home program  Decreased strength - therapeutic exercise, therapeutic activities and home program    Therapy Evaluation Codes:   1) History comprised of:   Personal factors that impact the plan of care:      None.    Comorbidity factors that impact the plan of care are:      Overweight and Weakness.     Medications impacting care: fenofibrate.  2) Examination of Body Systems comprised of:   Body structures and functions that impact the plan of care:      Shoulder.   Activity limitations that impact the plan of care are:      Bathing, Dressing, Lifting, Sleeping and working on automobiles.  3) Clinical presentation characteristics are:   Stable/Uncomplicated.  4) Decision-Making    Low complexity using standardized patient assessment instrument and/or measureable assessment of functional outcome.  Cumulative Therapy Evaluation is: Low complexity.    Previous and current functional limitations:  (See Goal Flow Sheet for this information)    Short term and Long term goals: (See Goal Flow Sheet for this information)     Communication ability:  Patient appears to be able to  clearly communicate and understand verbal and written communication and follow directions correctly.  Treatment Explanation - The following has been discussed with the patient:   RX ordered/plan of care  Anticipated outcomes  Possible risks and side effects  This patient would benefit from PT intervention to resume normal activities.   Rehab potential is good.    Frequency:  1 X week, once daily  Duration:  for 6 weeks  Discharge Plan:  Achieve all LTG.  Independent in home treatment program.  Reach maximal therapeutic benefit.    Please refer to the daily flowsheet for treatment today, total treatment time and time spent performing 1:1 timed codes.

## 2019-02-19 NOTE — PATIENT INSTRUCTIONS
1. Pain in joint of right shoulder    2. Os acromiale, right    3. Arthrosis of right acromioclavicular joint    4. Impingement syndrome of right shoulder      Activity modification as discussed - keep activity below your shoulder height and lift close to your body  Physical therapy: [facility] - [phone]  Reviewed your xray - os acromiale which predisposes you to getting rotator cuff tendonitis and impingement    Follow-up if not improving after 4-6 therapy sessions.

## 2019-02-26 ENCOUNTER — THERAPY VISIT (OUTPATIENT)
Dept: PHYSICAL THERAPY | Facility: CLINIC | Age: 59
End: 2019-02-26
Payer: COMMERCIAL

## 2019-02-26 DIAGNOSIS — M25.511 ACUTE PAIN OF RIGHT SHOULDER: ICD-10-CM

## 2019-02-26 DIAGNOSIS — M75.41 IMPINGEMENT SYNDROME OF SHOULDER REGION, RIGHT: ICD-10-CM

## 2019-02-26 PROCEDURE — 97110 THERAPEUTIC EXERCISES: CPT | Mod: GP | Performed by: PHYSICAL THERAPIST

## 2019-02-26 PROCEDURE — 97140 MANUAL THERAPY 1/> REGIONS: CPT | Mod: GP | Performed by: PHYSICAL THERAPIST

## 2019-03-12 ENCOUNTER — THERAPY VISIT (OUTPATIENT)
Dept: PHYSICAL THERAPY | Facility: CLINIC | Age: 59
End: 2019-03-12
Payer: COMMERCIAL

## 2019-03-12 DIAGNOSIS — M75.41 IMPINGEMENT SYNDROME OF SHOULDER REGION, RIGHT: ICD-10-CM

## 2019-03-12 DIAGNOSIS — M25.511 ACUTE PAIN OF RIGHT SHOULDER: ICD-10-CM

## 2019-03-12 PROCEDURE — 97140 MANUAL THERAPY 1/> REGIONS: CPT | Mod: GP | Performed by: PHYSICAL THERAPIST

## 2019-03-12 PROCEDURE — 97110 THERAPEUTIC EXERCISES: CPT | Mod: GP | Performed by: PHYSICAL THERAPIST

## 2019-03-12 NOTE — PROGRESS NOTES
Subjective:  HPI                    Objective:  System    Physical Exam    General     ROS    Assessment/Plan:    SUBJECTIVE  Subjective changes as noted by pt:  Pt reports being ill with the flu the past week, little change with symptoms     Current pain level: 2/10     Changes in function:  Activity has been limited secondary to being ill     Adverse reaction to treatment or activity:  None    OBJECTIVE  Changes in objective findings:  Pt denies pain with resisted ER/IR , pt notes mild pain with resisted abduction        ASSESSMENT  Reid continues to require intervention to meet STG and LTG's: PT  Patient's symptoms are resolving.  Response to therapy has shown an improvement in  pain level  Progress made towards STG/LTG?  Yes,     PLAN  Current treatment program is being advanced to more complex exercises.    PTA/ATC plan:  N/A    Please refer to the daily flowsheet for treatment today, total treatment time and time spent performing 1:1 timed codes.

## 2019-03-19 ENCOUNTER — THERAPY VISIT (OUTPATIENT)
Dept: PHYSICAL THERAPY | Facility: CLINIC | Age: 59
End: 2019-03-19
Payer: COMMERCIAL

## 2019-03-19 DIAGNOSIS — M75.41 IMPINGEMENT SYNDROME OF SHOULDER REGION, RIGHT: ICD-10-CM

## 2019-03-19 DIAGNOSIS — M25.511 ACUTE PAIN OF RIGHT SHOULDER: ICD-10-CM

## 2019-03-19 PROCEDURE — 97110 THERAPEUTIC EXERCISES: CPT | Mod: GP | Performed by: PHYSICAL THERAPIST

## 2019-03-19 PROCEDURE — 97530 THERAPEUTIC ACTIVITIES: CPT | Mod: GP | Performed by: PHYSICAL THERAPIST

## 2019-03-19 NOTE — PROGRESS NOTES
Subjective:  HPI                    Objective:  System                   Shoulder Evaluation:  ROM:  AROM:    Flexion:  Right:  130  Extension: Right: 55  Abduction:  Right:  110      External Rotation:  Right:  30                PROM:    Flexion:  Right: 150    Extension:  Right:  60  Abduction:  Right:  130      External Rotation:  Right:  40                    Strength:    Flexion: Right: 4+/5     Pain:   Extension:  Right: 5/5    Pain:  Abduction:  Right: 4+/5     Pain:  Adduction:  Right: 5/5     Pain:  Internal Rotation:  Right: 4+/5     Pain:  External Rotation:   Right:4/5     Pain:                                                     General     ROS    Assessment/Plan:    DISCHARGE REPORT    Progress reporting period is from 2-19-19 to 3-19-19.       SUBJECTIVE  Subjective changes noted by patient:  Pt notes increased strength and decreased pain. Pt still notes decreased flexiblity in the shoulder.       Current pain level is 4/10  .     Previous pain level was  7/10  .   Changes in function:  Pt notes increased ease with lying on the right shoulder and reaching back pocket   Adverse reaction to treatment or activity: None    OBJECTIVE  Changes noted in objective findings:  Mild pain with right impingement test.. See above for ROM and strength         ASSESSMENT/PLAN  Updated problem list and treatment plan: Diagnosis 1:  Right shoulder pain  Pain -  hot/cold therapy, manual therapy, self management, education and home program  Decreased ROM/flexibility - manual therapy, therapeutic exercise, therapeutic activity and home program  Decreased strength - therapeutic exercise, therapeutic activities and home program  STG/LTGs have been met or progress has been made towards goals:  Yes,   Assessment of Progress: The patient's condition is improving.  Self Management Plans:  Patient has been instructed in a home treatment program.  I have re-evaluated this patient and find that the nature, scope, duration and  intensity of the therapy is appropriate for the medical condition of the patient.  Reid continues to require the following intervention to meet STG and LTG's:  PT intervention is no longer required to meet STG/LTG.    Recommendations:  This patient is ready to be discharged from therapy and continue their home treatment program.    Please refer to the daily flowsheet for treatment today, total treatment time and time spent performing 1:1 timed codes.

## 2019-04-03 ENCOUNTER — OFFICE VISIT (OUTPATIENT)
Dept: FAMILY MEDICINE | Facility: CLINIC | Age: 59
End: 2019-04-03
Payer: COMMERCIAL

## 2019-04-03 VITALS
HEIGHT: 69 IN | DIASTOLIC BLOOD PRESSURE: 70 MMHG | SYSTOLIC BLOOD PRESSURE: 124 MMHG | HEART RATE: 75 BPM | OXYGEN SATURATION: 98 % | WEIGHT: 195 LBS | BODY MASS INDEX: 28.88 KG/M2 | TEMPERATURE: 98.2 F

## 2019-04-03 DIAGNOSIS — Z11.4 SCREENING FOR HIV (HUMAN IMMUNODEFICIENCY VIRUS): ICD-10-CM

## 2019-04-03 DIAGNOSIS — Z13.1 SCREENING FOR DIABETES MELLITUS: ICD-10-CM

## 2019-04-03 DIAGNOSIS — E78.1 HYPERTRIGLYCERIDEMIA: Primary | ICD-10-CM

## 2019-04-03 DIAGNOSIS — Z12.5 SCREENING FOR PROSTATE CANCER: ICD-10-CM

## 2019-04-03 LAB
ANION GAP SERPL CALCULATED.3IONS-SCNC: 7 MMOL/L (ref 3–14)
BUN SERPL-MCNC: 19 MG/DL (ref 7–30)
CALCIUM SERPL-MCNC: 8.9 MG/DL (ref 8.5–10.1)
CHLORIDE SERPL-SCNC: 108 MMOL/L (ref 94–109)
CHOLEST SERPL-MCNC: 188 MG/DL
CO2 SERPL-SCNC: 25 MMOL/L (ref 20–32)
CREAT SERPL-MCNC: 0.88 MG/DL (ref 0.66–1.25)
GFR SERPL CREATININE-BSD FRML MDRD: >90 ML/MIN/{1.73_M2}
GLUCOSE SERPL-MCNC: 96 MG/DL (ref 70–99)
HDLC SERPL-MCNC: 42 MG/DL
HIV 1+2 AB+HIV1 P24 AG SERPL QL IA: NONREACTIVE
LDLC SERPL CALC-MCNC: 114 MG/DL
NONHDLC SERPL-MCNC: 146 MG/DL
POTASSIUM SERPL-SCNC: 4 MMOL/L (ref 3.4–5.3)
PSA SERPL-ACNC: 2.35 UG/L (ref 0–4)
SODIUM SERPL-SCNC: 140 MMOL/L (ref 133–144)
TRIGL SERPL-MCNC: 158 MG/DL

## 2019-04-03 PROCEDURE — 36415 COLL VENOUS BLD VENIPUNCTURE: CPT | Performed by: FAMILY MEDICINE

## 2019-04-03 PROCEDURE — 99213 OFFICE O/P EST LOW 20 MIN: CPT | Performed by: FAMILY MEDICINE

## 2019-04-03 PROCEDURE — 80048 BASIC METABOLIC PNL TOTAL CA: CPT | Performed by: FAMILY MEDICINE

## 2019-04-03 PROCEDURE — 87389 HIV-1 AG W/HIV-1&-2 AB AG IA: CPT | Performed by: FAMILY MEDICINE

## 2019-04-03 PROCEDURE — G0103 PSA SCREENING: HCPCS | Performed by: FAMILY MEDICINE

## 2019-04-03 PROCEDURE — 80061 LIPID PANEL: CPT | Performed by: FAMILY MEDICINE

## 2019-04-03 RX ORDER — FENOFIBRATE 160 MG/1
160 TABLET ORAL DAILY
Qty: 90 TABLET | Refills: 3 | Status: SHIPPED | OUTPATIENT
Start: 2019-04-03 | End: 2020-04-07

## 2019-04-03 ASSESSMENT — MIFFLIN-ST. JEOR: SCORE: 1689.89

## 2019-04-03 NOTE — PROGRESS NOTES
SUBJECTIVE:                                                    Reid Monaco is a 59 year old male who presents to clinic today for the following health issues:        Medication Followup of fenofibrate    Taking Medication as prescribed: yes    Side Effects:  Some urinary urgency    Medication Helping Symptoms:  yes     The 10-year ASCVD risk score (Elvis DIEHL Jr., et al., 2013) is: 8.5%    Values used to calculate the score:      Age: 59 years      Sex: Male      Is Non- : No      Diabetic: No      Tobacco smoker: No      Systolic Blood Pressure: 124 mmHg      Is BP treated: No      HDL Cholesterol: 43 mg/dL      Total Cholesterol: 202 mg/dL    Denies any headaches, lightheadedness, dizziness, vision changes, chest pain, shortness of breath, dyspnea, muscle aches. Right shoulder continues to be painful and planning on following up with sports medicine.    Problem list and histories reviewed & adjusted, as indicated.  Additional history: as documented    Patient Active Problem List   Diagnosis     Hypertriglyceridemia     Family history of GI malignancy     Obesity     Hemorrhoids, internal, with bleeding     Health Care Home     Bradycardia     ACP (advance care planning)     History of colonic polyps     Past Surgical History:   Procedure Laterality Date     HC COLONOSCOPY THRU STOMA, DIAGNOSTIC      normal     VASECTOMY  10/2008       Social History     Tobacco Use     Smoking status: Former Smoker     Packs/day: 0.50     Years: 5.00     Pack years: 2.50     Smokeless tobacco: Never Used     Tobacco comment: quit age 28   Substance Use Topics     Alcohol use: Yes     Alcohol/week: 1.0 oz     Types: 2 drink(s) per week     Family History   Problem Relation Age of Onset     Heart Disease Mother         tachycardia     Cancer - colorectal Father          age 61     Hypertension Sister      Hypertension Brother      Hyperlipidemia Sister      Hyperlipidemia Brother      C.A.D. No  "family hx of      Diabetes No family hx of            ROS:  Constitutional, HEENT, cardiovascular, pulmonary, gi and gu systems are negative, except as otherwise noted.    OBJECTIVE:     /70   Pulse 75   Temp 98.2  F (36.8  C) (Oral)   Ht 1.753 m (5' 9\")   Wt 88.5 kg (195 lb)   SpO2 98%   BMI 28.80 kg/m    Body mass index is 28.8 kg/m .  GENERAL: healthy, alert and no distress  RESP: lungs clear to auscultation - no rales, rhonchi or wheezes  CV: regular rate and rhythm, normal S1 S2, no S3 or S4, no murmur, click or rub, no peripheral edema and peripheral pulses strong    Diagnostic Test Results:  none     ASSESSMENT/PLAN:   1. Hypertriglyceridemia: stable, recheck lipids. Fenofibrate refilled. Also discussed ASCVD 10 year risk being greater than 7.5% and recommendation to start statin medication. He declined.  - Lipid panel reflex to direct LDL Fasting  - fenofibrate (TRIGLIDE/LOFIBRA) 160 MG tablet; Take 1 tablet (160 mg) by mouth daily  Dispense: 90 tablet; Refill: 3    2. Screening for HIV (human immunodeficiency virus): Discussed CDC recommendion that everyone between the ages of 13 and 64 get tested for HIV at least once as part of routine health care. About 1 in 7 people in the [place] who have HIV don't know they have it. People at higher risk should get tested more often -- at least once per year.  - HIV Screening    3. Screening for diabetes mellitus  - Basic metabolic panel    4. Screening for prostate cancer: Discussed controversies surrounding PSA. Specifically reviewed 2017 USPSTF findings recommending discussion of PSA testing for men ages 55-69.  Reviewed findings of the  Randomized Study of Screening for Prostate Cancer which showed a 30% reduction in advanced stage prostate cancer and a 20% reduction in death rate from prostate cancer in this age group. PSA-based screening may prevent up to 2 deaths and up to 3 cases of metastatic disease per 1,000 men screened over 13 " years.  We've elected to do PSA this year after discussing these controversies.  - PSA, screen    Fady Castro, DO  [facility]

## 2019-04-16 ENCOUNTER — ALLIED HEALTH/NURSE VISIT (OUTPATIENT)
Dept: NURSING | Facility: CLINIC | Age: 59
End: 2019-04-16
Payer: COMMERCIAL

## 2019-04-16 DIAGNOSIS — Z23 NEED FOR HPV VACCINE: Primary | ICD-10-CM

## 2019-10-03 ENCOUNTER — ALLIED HEALTH/NURSE VISIT (OUTPATIENT)
Dept: FAMILY MEDICINE | Facility: CLINIC | Age: 59
End: 2019-10-03

## 2019-10-03 DIAGNOSIS — Z23 NEED FOR VACCINATION: Primary | ICD-10-CM

## 2019-10-03 PROCEDURE — 90686 IIV4 VACC NO PRSV 0.5 ML IM: CPT | Performed by: PHYSICIAN ASSISTANT

## 2019-10-03 PROCEDURE — 90471 IMMUNIZATION ADMIN: CPT | Performed by: PHYSICIAN ASSISTANT

## 2019-11-08 ENCOUNTER — HEALTH MAINTENANCE LETTER (OUTPATIENT)
Age: 59
End: 2019-11-08

## 2020-04-06 DIAGNOSIS — E78.1 HYPERTRIGLYCERIDEMIA: ICD-10-CM

## 2020-04-07 RX ORDER — FENOFIBRATE 160 MG/1
160 TABLET ORAL DAILY
Qty: 90 TABLET | Refills: 0 | Status: SHIPPED | OUTPATIENT
Start: 2020-04-07 | End: 2020-07-13

## 2020-04-07 NOTE — TELEPHONE ENCOUNTER
"Requested Prescriptions   Pending Prescriptions Disp Refills     fenofibrate (TRIGLIDE/LOFIBRA) 160 MG tablet 90 tablet 3     Sig: Take 1 tablet (160 mg) by mouth daily   Last Written Prescription Date:  4/3/19  Last Fill Quantity: 90,  # refills: 3   Last office visit: 10/3/2019 with prescribing provider:  Gloria   Future Office Visit:        Fibrates Failed - 4/6/2020  3:29 PM        Failed - Lipid panel on file in past 12 months     Recent Labs   Lab Test 04/03/19  0856  03/27/17  0822   CHOL 188   < > 139   TRIG 158*   < > 76   HDL 42   < > 49   *   < > 75   NHDL 146*   < >  --    CHOLHDLRATIO  --   --  2.8    < > = values in this interval not displayed.               Failed - Recent (12 mo) or future (30 days) visit within the authorizing provider's specialty     Patient has had an office visit with the authorizing provider or a provider within the authorizing providers department within the previous 12 mos or has a future within next 30 days. See \"Patient Info\" tab in inbasket, or \"Choose Columns\" in Meds & Orders section of the refill encounter.              Passed - No abnormal creatine kinase in past 12 months     No lab results found.             Passed - Medication is active on med list        Passed - Patient is age 18 or older           Medication is being filled for 1 time refill only due to:  Patient needs to be seen because it has been more than one year since last visit.   DEWEY ChavezN, RN  [facility]      "

## 2020-07-13 ENCOUNTER — MYC REFILL (OUTPATIENT)
Dept: FAMILY MEDICINE | Facility: CLINIC | Age: 60
End: 2020-07-13

## 2020-07-13 DIAGNOSIS — E78.1 HYPERTRIGLYCERIDEMIA: ICD-10-CM

## 2020-07-14 ENCOUNTER — MYC MEDICAL ADVICE (OUTPATIENT)
Dept: FAMILY MEDICINE | Facility: CLINIC | Age: 60
End: 2020-07-14

## 2020-07-14 RX ORDER — FENOFIBRATE 160 MG/1
160 TABLET ORAL DAILY
Qty: 60 TABLET | Refills: 0 | Status: SHIPPED | OUTPATIENT
Start: 2020-07-14 | End: 2020-09-09

## 2020-07-14 RX ORDER — FENOFIBRATE 160 MG/1
TABLET ORAL
Qty: 90 TABLET | Refills: 0 | OUTPATIENT
Start: 2020-07-14

## 2020-07-14 NOTE — TELEPHONE ENCOUNTER
Patient has not been seen in over 1 year. Routing to team to inform and assist in scheduling.   Liliana Aguayo RN   [facility] - Triage

## 2020-07-14 NOTE — TELEPHONE ENCOUNTER
Routing refill request to provider for review/approval because:  Labs not current:  Lipid Panel    Michelle Hawkins RN, BSN  [facility]

## 2020-07-14 NOTE — TELEPHONE ENCOUNTER
Patient has appointment on 9/9/20. Roslyn hairston approved.  Amira Zuleta, BSN, RN  [facility]

## 2020-07-14 NOTE — TELEPHONE ENCOUNTER
Reason for Call:  Other prescription    Detailed comments: Pt called this afternoon and would like a refill on his cholesterol medication. The pt is not able to come in for his appt. Until 09/09/2020 for his PX and med check and labs. The pt is wondering if we are able to refill his prescription until his appt. Please give pt a call if there are additional questions. Thank you.    Phone Number Patient can be reached at: Home number on file [phone] (home)    Best Time:     Can we leave a detailed message on this number? YES    Call taken on 7/14/2020 at 3:31 PM by Zara Chavez

## 2020-09-09 ENCOUNTER — OFFICE VISIT (OUTPATIENT)
Dept: FAMILY MEDICINE | Facility: CLINIC | Age: 60
End: 2020-09-09
Payer: COMMERCIAL

## 2020-09-09 VITALS
WEIGHT: 210.25 LBS | DIASTOLIC BLOOD PRESSURE: 82 MMHG | BODY MASS INDEX: 31.14 KG/M2 | HEIGHT: 69 IN | HEART RATE: 68 BPM | TEMPERATURE: 96.6 F | SYSTOLIC BLOOD PRESSURE: 108 MMHG | OXYGEN SATURATION: 97 %

## 2020-09-09 DIAGNOSIS — E78.1 HYPERTRIGLYCERIDEMIA: ICD-10-CM

## 2020-09-09 DIAGNOSIS — Z00.00 ROUTINE GENERAL MEDICAL EXAMINATION AT A HEALTH CARE FACILITY: Primary | ICD-10-CM

## 2020-09-09 DIAGNOSIS — Z13.1 SCREENING FOR DIABETES MELLITUS: ICD-10-CM

## 2020-09-09 DIAGNOSIS — Z12.5 SCREENING FOR PROSTATE CANCER: ICD-10-CM

## 2020-09-09 DIAGNOSIS — Z12.11 SCREENING FOR COLON CANCER: ICD-10-CM

## 2020-09-09 LAB
ALBUMIN SERPL-MCNC: 4 G/DL (ref 3.4–5)
ALP SERPL-CCNC: 45 U/L (ref 40–150)
ALT SERPL W P-5'-P-CCNC: 37 U/L (ref 0–70)
ANION GAP SERPL CALCULATED.3IONS-SCNC: 4 MMOL/L (ref 3–14)
AST SERPL W P-5'-P-CCNC: 25 U/L (ref 0–45)
BILIRUB SERPL-MCNC: 0.7 MG/DL (ref 0.2–1.3)
BUN SERPL-MCNC: 20 MG/DL (ref 7–30)
CALCIUM SERPL-MCNC: 8.7 MG/DL (ref 8.5–10.1)
CHLORIDE SERPL-SCNC: 105 MMOL/L (ref 94–109)
CHOLEST SERPL-MCNC: 188 MG/DL
CO2 SERPL-SCNC: 28 MMOL/L (ref 20–32)
CREAT SERPL-MCNC: 1.04 MG/DL (ref 0.66–1.25)
GFR SERPL CREATININE-BSD FRML MDRD: 77 ML/MIN/{1.73_M2}
GLUCOSE SERPL-MCNC: 93 MG/DL (ref 70–99)
HDLC SERPL-MCNC: 40 MG/DL
LDLC SERPL CALC-MCNC: 103 MG/DL
NONHDLC SERPL-MCNC: 148 MG/DL
POTASSIUM SERPL-SCNC: 4 MMOL/L (ref 3.4–5.3)
PROT SERPL-MCNC: 7.6 G/DL (ref 6.8–8.8)
PSA SERPL-ACNC: 2.34 UG/L (ref 0–4)
SODIUM SERPL-SCNC: 137 MMOL/L (ref 133–144)
TRIGL SERPL-MCNC: 226 MG/DL

## 2020-09-09 PROCEDURE — 99396 PREV VISIT EST AGE 40-64: CPT | Performed by: FAMILY MEDICINE

## 2020-09-09 PROCEDURE — G0103 PSA SCREENING: HCPCS | Performed by: FAMILY MEDICINE

## 2020-09-09 PROCEDURE — 36415 COLL VENOUS BLD VENIPUNCTURE: CPT | Performed by: FAMILY MEDICINE

## 2020-09-09 PROCEDURE — 80061 LIPID PANEL: CPT | Performed by: FAMILY MEDICINE

## 2020-09-09 PROCEDURE — 80053 COMPREHEN METABOLIC PANEL: CPT | Performed by: FAMILY MEDICINE

## 2020-09-09 RX ORDER — FENOFIBRATE 160 MG/1
160 TABLET ORAL DAILY
Qty: 90 TABLET | Refills: 3 | Status: SHIPPED | OUTPATIENT
Start: 2020-09-09 | End: 2021-09-14

## 2020-09-09 ASSESSMENT — MIFFLIN-ST. JEOR: SCORE: 1754.07

## 2020-09-09 NOTE — PROGRESS NOTES
"SUBJECTIVE:   CC: Reid Monaco is an 60 year old male who presents for preventative health visit.       Healthy Habits:     Getting at least 3 servings of Calcium per day:  NO    Bi-annual eye exam:  Yes    Dental care twice a year:  Yes    Sleep apnea or symptoms of sleep apnea:  None    Diet:  Regular (no restrictions)    Frequency of exercise:  6-7 days/week    Duration of exercise:  30-45 minutes    Taking medications regularly:  Yes    Barriers to taking medications:  Not applicable    Medication side effects:  None    PHQ-2 Total Score: 1    Additional concerns today:  No     He has noticed weight going up being at home more. Rejoined Weight [employer] which has been effective in the past to help lose weight. He also does a lot of walking every day. Doesn't feel constipated and isn't having diarrhea. No spot in underwear but feels a \"tickling\" sensation that wiping resolves.       Medication Followup of fenofibrate    Taking Medication as prescribed: yes    Side Effects:  yes    Medication Helping Symptoms: yes    Has noticed some \"anal leakage\" - infrequent but enough to be annoying. He only really notices when he is taking his dog on a walk. States he had a piece of tissue \"dentate\" removed about 4 years ago. Per chart review and hypertrophied anal papilla removed by [facility]   (2017).    Today's PHQ-2 Score:   PHQ-2 ( 1999 [employer]) 9/9/2020   Q1: Little interest or pleasure in doing things 1   Q2: Feeling down, depressed or hopeless 0   PHQ-2 Score 1   Q1: Little interest or pleasure in doing things Several days   Q2: Feeling down, depressed or hopeless Not at all   PHQ-2 Score 1       Abuse: Current or Past(Physical, Sexual or Emotional)- No  Do you feel safe in your environment? Yes    Have you ever done Advance Care Planning? (For example, a Health Directive, POLST, or a discussion with a medical provider or your loved ones about your wishes): No, advance care planning " information given to patient to review.  Advanced care planning was discussed at today's visit.    Social History     Tobacco Use     Smoking status: Former Smoker     Packs/day: 0.50     Years: 5.00     Pack years: 2.50     Smokeless tobacco: Never Used     Tobacco comment: quit age 28   Substance Use Topics     Alcohol use: Yes     Alcohol/week: 1.7 standard drinks     Types: 2 drink(s) per week         Alcohol Use 9/9/2020   Prescreen: >3 drinks/day or >7 drinks/week? No   Prescreen: >3 drinks/day or >7 drinks/week? -     Last PSA:   [facility]ott PSA   Date Value Ref Range Status   03/27/2014 0.6 < OR = 4.0 ng/mL Final     Comment:        This test was performed using the Siemens  chemiluminescent method. Values obtained from  different assay methods cannot be used  interchangeably. PSA levels, regardless of  value, should not be interpreted as absolute  evidence of the presence or absence of disease.        PSA   Date Value Ref Range Status   04/03/2019 2.35 0 - 4 ug/L Final     Comment:     Assay Method:  Chemiluminescence using Siemens Vista analyzer       Reviewed orders with patient. Reviewed health maintenance and updated orders accordingly - Yes  Lab work is in process    Reviewed and updated as needed this visit by clinical staff  Tobacco  Allergies  Meds         Reviewed and updated as needed this visit by Provider  Allergies  Meds            Review of Systems  CONSTITUTIONAL: NEGATIVE for fever, chills, change in weight  INTEGUMENTARY/SKIN: NEGATIVE for worrisome rashes, moles or lesions  EYES: NEGATIVE for vision changes or irritation  ENT: NEGATIVE for ear, mouth and throat problems  RESP: NEGATIVE for significant cough or SOB  CV: NEGATIVE for chest pain, palpitations or peripheral edema  GI: NEGATIVE for nausea, abdominal pain, heartburn, or change in bowel habits   male: negative for dysuria, hematuria, decreased urinary stream, erectile dysfunction, urethral discharge  MUSCULOSKELETAL: NEGATIVE  "for significant arthralgias or myalgia  NEURO: NEGATIVE for weakness, dizziness or paresthesias  PSYCHIATRIC: NEGATIVE for changes in mood or affect    OBJECTIVE:   /82   Pulse 68   Temp 96.6  F (35.9  C)   Ht 1.753 m (5' 9\")   Wt 95.4 kg (210 lb 4 oz)   SpO2 97%   BMI 31.05 kg/m      Physical Exam  GENERAL: healthy, alert and no distress  EYES: Eyes grossly normal to inspection, PERRL and conjunctivae and sclerae normal  HENT: ear canals and TM's normal, nose and mouth without ulcers or lesions  NECK: no adenopathy, no asymmetry, masses, or scars and thyroid normal to palpation  RESP: lungs clear to auscultation - no rales, rhonchi or wheezes  CV: regular rate and rhythm, normal S1 S2, no S3 or S4, no murmur, click or rub, no peripheral edema and peripheral pulses strong  ABDOMEN: soft, nontender, no hepatosplenomegaly, no masses and bowel sounds normal  MS: no gross musculoskeletal defects noted, no edema  SKIN: multiple skin tags on back  NEURO: Normal strength and tone, mentation intact and speech normal  PSYCH: mentation appears normal, affect normal/bright    Diagnostic Test Results:  Labs reviewed in Epic    ASSESSMENT/PLAN:   1. Routine general medical examination at a health care facility: health maintenance reviewed and updated.  - REVIEW OF HEALTH MAINTENANCE PROTOCOL ORDERS    2. Hypertriglyceridemia  - Lipid panel reflex to direct LDL Fasting  - fenofibrate (TRIGLIDE/LOFIBRA) 160 MG tablet; Take 1 tablet (160 mg) by mouth daily  Dispense: 90 tablet; Refill: 3    3. Screening for diabetes mellitus  - Comprehensive metabolic panel (BMP + Alb, Alk Phos, ALT, AST, Total. Bili, TP)    4. Screening for prostate cancer  - PSA, screen    5. Screening for colon cancer  - GASTROENTEROLOGY ADULT REF PROCEDURE ONLY; Future    COUNSELING:   Reviewed preventive health counseling, as reflected in patient instructions       Regular exercise       Healthy diet/nutrition    Estimated body mass index is 31.05 " "kg/m  as calculated from the following:    Height as of this encounter: 1.753 m (5' 9\").    Weight as of this encounter: 95.4 kg (210 lb 4 oz).     Weight management plan: Discussed healthy diet and exercise guidelines    He reports that he has quit smoking. He has a 2.50 pack-year smoking history. He has never used smokeless tobacco.      Counseling Resources:  ATP IV Guidelines  Pooled Cohorts Equation Calculator  FRAX Risk Assessment  ICSI Preventive Guidelines  Dietary Guidelines for Americans, 2010  USDA's MyPlate  ASA Prophylaxis  Lung CA Screening    Fady Castro, DO  [facility] [place]  "

## 2020-10-21 ENCOUNTER — ALLIED HEALTH/NURSE VISIT (OUTPATIENT)
Dept: NURSING | Facility: CLINIC | Age: 60
End: 2020-10-21
Payer: COMMERCIAL

## 2020-10-21 DIAGNOSIS — Z23 NEED FOR PROPHYLACTIC VACCINATION AND INOCULATION AGAINST INFLUENZA: Primary | ICD-10-CM

## 2020-10-21 PROCEDURE — 90471 IMMUNIZATION ADMIN: CPT

## 2020-10-21 PROCEDURE — 99207 PR NO CHARGE NURSE ONLY: CPT

## 2020-10-21 PROCEDURE — 90682 RIV4 VACC RECOMBINANT DNA IM: CPT

## 2020-11-09 ENCOUNTER — TRANSFERRED RECORDS (OUTPATIENT)
Dept: HEALTH INFORMATION MANAGEMENT | Facility: CLINIC | Age: 60
End: 2020-11-09

## 2021-02-13 ENCOUNTER — NURSE TRIAGE (OUTPATIENT)
Dept: NURSING | Facility: CLINIC | Age: 61
End: 2021-02-13

## 2021-02-13 ENCOUNTER — HOSPITAL ENCOUNTER (EMERGENCY)
Facility: CLINIC | Age: 61
Discharge: HOME | End: 2021-02-13
Attending: EMERGENCY MEDICINE | Admitting: EMERGENCY MEDICINE
Payer: COMMERCIAL

## 2021-02-13 ENCOUNTER — APPOINTMENT (OUTPATIENT)
Dept: CT IMAGING | Facility: CLINIC | Age: 61
End: 2021-02-13
Attending: EMERGENCY MEDICINE
Payer: COMMERCIAL

## 2021-02-13 VITALS
TEMPERATURE: 98.2 F | SYSTOLIC BLOOD PRESSURE: 123 MMHG | DIASTOLIC BLOOD PRESSURE: 72 MMHG | OXYGEN SATURATION: 100 % | RESPIRATION RATE: 20 BRPM | HEART RATE: 56 BPM

## 2021-02-13 VITALS
HEART RATE: 83 BPM | RESPIRATION RATE: 18 BRPM | WEIGHT: 185 LBS | OXYGEN SATURATION: 99 % | BODY MASS INDEX: 27.32 KG/M2 | TEMPERATURE: 97.7 F

## 2021-02-13 DIAGNOSIS — R10.84 ABDOMINAL PAIN, GENERALIZED: ICD-10-CM

## 2021-02-13 DIAGNOSIS — R10.84 GENERALIZED ABDOMINAL PAIN: ICD-10-CM

## 2021-02-13 DIAGNOSIS — R51.9 ACUTE NONINTRACTABLE HEADACHE, UNSPECIFIED HEADACHE TYPE: ICD-10-CM

## 2021-02-13 DIAGNOSIS — R11.2 NON-INTRACTABLE VOMITING WITH NAUSEA, UNSPECIFIED VOMITING TYPE: ICD-10-CM

## 2021-02-13 LAB
ALBUMIN UR-MCNC: NEGATIVE MG/DL
AMORPH CRY #/AREA URNS HPF: ABNORMAL /HPF
ANION GAP SERPL CALCULATED.3IONS-SCNC: 6 MMOL/L (ref 3–14)
APPEARANCE UR: ABNORMAL
BASOPHILS # BLD AUTO: 0 10E9/L (ref 0–0.2)
BASOPHILS # BLD AUTO: 0 10E9/L (ref 0–0.2)
BASOPHILS NFR BLD AUTO: 0.2 %
BASOPHILS NFR BLD AUTO: 0.3 %
BILIRUB UR QL STRIP: NEGATIVE
BUN SERPL-MCNC: 18 MG/DL (ref 7–30)
CALCIUM SERPL-MCNC: 9.7 MG/DL (ref 8.5–10.1)
CHLORIDE SERPL-SCNC: 104 MMOL/L (ref 94–109)
CO2 SERPL-SCNC: 30 MMOL/L (ref 20–32)
COLOR UR AUTO: YELLOW
CREAT SERPL-MCNC: 1 MG/DL (ref 0.66–1.25)
DIFFERENTIAL METHOD BLD: NORMAL
DIFFERENTIAL METHOD BLD: NORMAL
EOSINOPHIL # BLD AUTO: 0.3 10E9/L (ref 0–0.7)
EOSINOPHIL # BLD AUTO: 0.3 10E9/L (ref 0–0.7)
EOSINOPHIL NFR BLD AUTO: 3.9 %
EOSINOPHIL NFR BLD AUTO: 4.8 %
ERYTHROCYTE [DISTWIDTH] IN BLOOD BY AUTOMATED COUNT: 12.1 % (ref 10–15)
ERYTHROCYTE [DISTWIDTH] IN BLOOD BY AUTOMATED COUNT: 12.1 % (ref 10–15)
GFR SERPL CREATININE-BSD FRML MDRD: 81 ML/MIN/{1.73_M2}
GLUCOSE BLDC GLUCOMTR-MCNC: 137 MG/DL (ref 70–99)
GLUCOSE SERPL-MCNC: 112 MG/DL (ref 70–99)
GLUCOSE UR STRIP-MCNC: NEGATIVE MG/DL
HCT VFR BLD AUTO: 47.7 % (ref 40–53)
HCT VFR BLD AUTO: 50.5 % (ref 40–53)
HGB BLD-MCNC: 16 G/DL (ref 13.3–17.7)
HGB BLD-MCNC: 17.1 G/DL (ref 13.3–17.7)
HGB UR QL STRIP: NEGATIVE
IMM GRANULOCYTES # BLD: 0 10E9/L (ref 0–0.4)
IMM GRANULOCYTES # BLD: 0 10E9/L (ref 0–0.4)
IMM GRANULOCYTES NFR BLD: 0.1 %
IMM GRANULOCYTES NFR BLD: 0.2 %
KETONES UR STRIP-MCNC: NEGATIVE MG/DL
LEUKOCYTE ESTERASE UR QL STRIP: NEGATIVE
LYMPHOCYTES # BLD AUTO: 1.2 10E9/L (ref 0.8–5.3)
LYMPHOCYTES # BLD AUTO: 1.7 10E9/L (ref 0.8–5.3)
LYMPHOCYTES NFR BLD AUTO: 18.3 %
LYMPHOCYTES NFR BLD AUTO: 25.5 %
MCH RBC QN AUTO: 28.9 PG (ref 26.5–33)
MCH RBC QN AUTO: 29.5 PG (ref 26.5–33)
MCHC RBC AUTO-ENTMCNC: 33.5 G/DL (ref 31.5–36.5)
MCHC RBC AUTO-ENTMCNC: 33.9 G/DL (ref 31.5–36.5)
MCV RBC AUTO: 86 FL (ref 78–100)
MCV RBC AUTO: 87 FL (ref 78–100)
MONOCYTES # BLD AUTO: 0.4 10E9/L (ref 0–1.3)
MONOCYTES # BLD AUTO: 0.5 10E9/L (ref 0–1.3)
MONOCYTES NFR BLD AUTO: 6.5 %
MONOCYTES NFR BLD AUTO: 7.1 %
MUCOUS THREADS #/AREA URNS LPF: PRESENT /LPF
NEUTROPHILS # BLD AUTO: 4.2 10E9/L (ref 1.6–8.3)
NEUTROPHILS # BLD AUTO: 4.7 10E9/L (ref 1.6–8.3)
NEUTROPHILS NFR BLD AUTO: 62.8 %
NEUTROPHILS NFR BLD AUTO: 70.3 %
NITRATE UR QL: NEGATIVE
NRBC # BLD AUTO: 0 10*3/UL
NRBC # BLD AUTO: 0 10*3/UL
NRBC BLD AUTO-RTO: 0 /100
NRBC BLD AUTO-RTO: 0 /100
PH UR STRIP: 7.5 PH (ref 5–7)
PLATELET # BLD AUTO: 242 10E9/L (ref 150–450)
PLATELET # BLD AUTO: 267 10E9/L (ref 150–450)
POTASSIUM SERPL-SCNC: 3.4 MMOL/L (ref 3.4–5.3)
RBC # BLD AUTO: 5.53 10E12/L (ref 4.4–5.9)
RBC # BLD AUTO: 5.8 10E12/L (ref 4.4–5.9)
RBC #/AREA URNS AUTO: 2 /HPF (ref 0–2)
SODIUM SERPL-SCNC: 140 MMOL/L (ref 133–144)
SOURCE: ABNORMAL
SP GR UR STRIP: 1.02 (ref 1–1.03)
UROBILINOGEN UR STRIP-MCNC: NORMAL MG/DL (ref 0–2)
WBC # BLD AUTO: 6.6 10E9/L (ref 4–11)
WBC # BLD AUTO: 6.7 10E9/L (ref 4–11)
WBC #/AREA URNS AUTO: 0 /HPF (ref 0–5)

## 2021-02-13 PROCEDURE — 74176 CT ABD & PELVIS W/O CONTRAST: CPT

## 2021-02-13 PROCEDURE — 250N000011 HC RX IP 250 OP 636: Performed by: EMERGENCY MEDICINE

## 2021-02-13 PROCEDURE — 99285 EMERGENCY DEPT VISIT HI MDM: CPT | Mod: 25

## 2021-02-13 PROCEDURE — 99284 EMERGENCY DEPT VISIT MOD MDM: CPT | Mod: 25

## 2021-02-13 PROCEDURE — 85025 COMPLETE CBC W/AUTO DIFF WBC: CPT | Performed by: EMERGENCY MEDICINE

## 2021-02-13 PROCEDURE — 80048 BASIC METABOLIC PNL TOTAL CA: CPT | Performed by: EMERGENCY MEDICINE

## 2021-02-13 PROCEDURE — 999N001017 HC STATISTIC GLUCOSE BY METER IP

## 2021-02-13 PROCEDURE — 70450 CT HEAD/BRAIN W/O DYE: CPT

## 2021-02-13 PROCEDURE — 96374 THER/PROPH/DIAG INJ IV PUSH: CPT

## 2021-02-13 PROCEDURE — 96375 TX/PRO/DX INJ NEW DRUG ADDON: CPT

## 2021-02-13 PROCEDURE — 96361 HYDRATE IV INFUSION ADD-ON: CPT

## 2021-02-13 PROCEDURE — 258N000003 HC RX IP 258 OP 636: Performed by: EMERGENCY MEDICINE

## 2021-02-13 PROCEDURE — 81001 URINALYSIS AUTO W/SCOPE: CPT | Performed by: EMERGENCY MEDICINE

## 2021-02-13 RX ORDER — KETOROLAC TROMETHAMINE 15 MG/ML
15 INJECTION, SOLUTION INTRAMUSCULAR; INTRAVENOUS ONCE
Status: COMPLETED | OUTPATIENT
Start: 2021-02-13 | End: 2021-02-13

## 2021-02-13 RX ORDER — DICYCLOMINE HCL 20 MG
20 TABLET ORAL 2 TIMES DAILY
Qty: 10 TABLET | Refills: 0 | Status: SHIPPED | OUTPATIENT
Start: 2021-02-13 | End: 2021-02-23

## 2021-02-13 RX ORDER — ONDANSETRON 2 MG/ML
4 INJECTION INTRAMUSCULAR; INTRAVENOUS ONCE
Status: COMPLETED | OUTPATIENT
Start: 2021-02-13 | End: 2021-02-13

## 2021-02-13 RX ORDER — METOCLOPRAMIDE HYDROCHLORIDE 5 MG/ML
10 INJECTION INTRAMUSCULAR; INTRAVENOUS ONCE
Status: COMPLETED | OUTPATIENT
Start: 2021-02-13 | End: 2021-02-13

## 2021-02-13 RX ORDER — ONDANSETRON 4 MG/1
4 TABLET, ORALLY DISINTEGRATING ORAL EVERY 8 HOURS PRN
Qty: 10 TABLET | Refills: 0 | Status: SHIPPED | OUTPATIENT
Start: 2021-02-13 | End: 2021-02-16

## 2021-02-13 RX ORDER — METOCLOPRAMIDE 10 MG/1
10 TABLET ORAL 4 TIMES DAILY PRN
Qty: 10 TABLET | Refills: 0 | Status: SHIPPED | OUTPATIENT
Start: 2021-02-13 | End: 2021-09-14

## 2021-02-13 RX ORDER — DICYCLOMINE HCL 20 MG
20 TABLET ORAL 4 TIMES DAILY PRN
Qty: 15 TABLET | Refills: 0 | Status: SHIPPED | OUTPATIENT
Start: 2021-02-13 | End: 2021-02-13

## 2021-02-13 RX ORDER — DIPHENHYDRAMINE HYDROCHLORIDE 50 MG/ML
25 INJECTION INTRAMUSCULAR; INTRAVENOUS ONCE
Status: COMPLETED | OUTPATIENT
Start: 2021-02-13 | End: 2021-02-13

## 2021-02-13 RX ADMIN — SODIUM CHLORIDE 1000 ML: 9 INJECTION, SOLUTION INTRAVENOUS at 20:34

## 2021-02-13 RX ADMIN — KETOROLAC TROMETHAMINE 15 MG: 15 INJECTION, SOLUTION INTRAMUSCULAR; INTRAVENOUS at 20:34

## 2021-02-13 RX ADMIN — ONDANSETRON 4 MG: 2 INJECTION INTRAMUSCULAR; INTRAVENOUS at 20:34

## 2021-02-13 RX ADMIN — DIPHENHYDRAMINE HYDROCHLORIDE 25 MG: 50 INJECTION, SOLUTION INTRAMUSCULAR; INTRAVENOUS at 15:32

## 2021-02-13 RX ADMIN — METOCLOPRAMIDE HYDROCHLORIDE 10 MG: 5 INJECTION INTRAMUSCULAR; INTRAVENOUS at 15:32

## 2021-02-13 ASSESSMENT — ENCOUNTER SYMPTOMS
DYSURIA: 0
VOMITING: 1
ABDOMINAL DISTENTION: 1
NAUSEA: 1
ABDOMINAL PAIN: 1
ABDOMINAL PAIN: 1
FEVER: 0
HEADACHES: 1
VOMITING: 1
DIFFICULTY URINATING: 0
NAUSEA: 1

## 2021-02-13 NOTE — ED TRIAGE NOTES
Pt c/o n/v and ab pain all day, pt was throwing up about 1330 had a sudden onset of worse headache of his life and worse vomiting since.

## 2021-02-13 NOTE — DISCHARGE INSTRUCTIONS
Discharge Instructions  Headache    You were seen today for a headache. Headaches may be caused by many different things such as muscle tension, sinus inflammation, anxiety and stress, having too little sleep, too much alcohol, some medical conditions or injury. You may have a migraine, which is caused by changes in the blood vessels in your head.  At this time your provider does not find that your headache is a sign of anything dangerous or life-threatening.  However, sometimes the signs of serious illness do not show up right away.      Generally, every Emergency Department visit should have a follow-up clinic visit with either a primary or a specialty clinic/provider. Please follow-up as instructed by your emergency provider today.    Return to the Emergency Department if:  You get a new fever of 100.4 F or higher.  Your headache gets much worse.  You get a stiff neck with your headache.  You get a new headache that is significantly different or worse than headaches you have had before.  You are vomiting (throwing up) and cannot keep food or water down.  You have blurry or double vision or other problems with your eyes.  You have a new weakness on one side of your body.  You have difficulty with balance which is new.  You or your family thinks you are confused.  You have a seizure.    What can I do to help myself?  Pain medications - You may take a pain medication such as Tylenol  (acetaminophen), Advil , Motrin  (ibuprofen) or Aleve  (naproxen).  Take a pain reliever as soon as you notice symptoms.  Starting medications as soon as you start to have symptoms may lessen the amount of pain you have.  Relaxing in a quiet, dark room may help.  Get enough sleep and eat meals regularly.  You may need to watch for certain foods or other things which may trigger your headaches.  Keeping a journal of your headaches and possible triggers may help you and your primary provider to identify things which you should avoid which  may be causing your headaches.  If you were given a prescription for medicine here today, be sure to read all of the information (including the package insert) that comes with your prescription.  This will include important information about the medicine, its side effects, and any warnings that you need to know about.  The pharmacist who fills the prescription can provide more information and answer questions you may have about the medicine.  If you have questions or concerns that the pharmacist cannot address, please call or return to the Emergency Department.   Remember that you can always come back to the Emergency Department if you are not able to see your regular provider in the amount of time listed above, if you get any new symptoms, or if there is anything that worries you.  Discharge Instructions  Abdominal Pain    Abdominal pain (belly pain) can be caused by many things. Your evaluation today does not show the exact cause for your pain. Your provider today has decided that it is unlikely your pain is due to a life threatening problem, or a problem requiring surgery or hospital admission. Sometimes those problems cannot be found right away, so it is very important that you follow up as directed.  Sometimes only the changes which occur over time allow the cause of your pain to be found.    Generally, every Emergency Department visit should have a follow-up clinic visit with either a primary or a specialty clinic/provider. Please follow-up as instructed by your emergency provider today. With abdominal pain, we often recommend very close follow-up, such as the following day.    ADULTS:  Return to the Emergency Department right away if:    You get an oral temperature above 102oF or as directed by your provider.  You have blood in your stools. This may be bright red or appear as black, tarry stools.    You keep vomiting (throwing up) or cannot drink liquids.  You see blood when you vomit.   You cannot have a  bowel movement or you cannot pass gas.  Your stomach gets bloated or bigger.  Your skin or the whites of your eyes look yellow.  You faint.  You have bloody, frequent or painful urination (peeing).  You have new symptoms or anything that worries you.    CHILDREN:  Return to the Emergency Department right away if your child has any of the above-listed symptoms or the following:    Pushes your hand away or screams/cries when his/her belly is touched.  You notice your child is very fussy or weak.  Your child is very tired and is too tired to eat or drink.  Your child is dehydrated.  Signs of dehydration can be:  Significant change in the amount of wet diapers/urine.  Your infant or child starts to have dry mouth and lips, or no saliva (spit) or tears.    PREGNANT WOMEN:  Return to the Emergency Department right away if you have any of the above-listed symptoms or the following:    You have bleeding, leaking fluid or passing tissue from the vagina.  You have worse pain or cramping, or pain in your shoulder or back.  You have vomiting that will not stop.  You have a temperature of 100oF or more.  Your baby is not moving as much as usual.  You faint.  You get a bad headache with or without eye problems and abdominal pain.  You have a seizure.  You have unusual discharge from your vagina and abdominal pain.    Abdominal pain is pretty common during pregnancy.  Your pain may or may not be related to your pregnancy. You should follow-up closely with your OB provider so they can evaluate you and your baby.  Until you follow-up with your regular provider, do the following:     Avoid sex and do not put anything in your vagina.  Drink clear fluids.  Only take medications approved by your provider.    MORE INFORMATION:    Appendicitis:  A possible cause of abdominal pain in any person who still has their appendix is acute appendicitis. Appendicitis is often hard to diagnose.  Testing does not always rule out early appendicitis or  "other causes of abdominal pain. Close follow-up with your provider and re-evaluations may be needed to figure out the reason for your abdominal pain.    Follow-up:  It is very important that you make an appointment with your clinic and go to the appointment.  If you do not follow-up with your primary provider, it may result in missing an important development which could result in permanent injury or disability and/or lasting pain.  If there is any problem keeping your appointment, call your provider or return to the Emergency Department.    Medications:  Take your medications as directed by your provider today.  Before using over-the-counter medications, ask your provider and make sure to take the medications as directed.  If you have any questions about medications, ask your provider.    Diet:  Resume your normal diet as much as possible, but do not eat fried, fatty or spicy foods while you have pain.  Do not drink alcohol or have caffeine.  Do not smoke tobacco.    Probiotics: If you have been given an antibiotic, you may want to also take a probiotic pill or eat yogurt with live cultures. Probiotics have \"good bacteria\" to help your intestines stay healthy. Studies have shown that probiotics help prevent diarrhea (loose stools) and other intestine problems (including C. diff infection) when you take antibiotics. You can buy these without a prescription in the pharmacy section of the store.     If you were given a prescription for medicine here today, be sure to read all of the information (including the package insert) that comes with your prescription.  This will include important information about the medicine, its side effects, and any warnings that you need to know about.  The pharmacist who fills the prescription can provide more information and answer questions you may have about the medicine.  If you have questions or concerns that the pharmacist cannot address, please call or return to the Emergency " Department.       Remember that you can always come back to the Emergency Department if you are not able to see your regular provider in the amount of time listed above, if you get any new symptoms, or if there is anything that worries you.

## 2021-02-13 NOTE — ED PROVIDER NOTES
History   Chief Complaint:  Headache     HPI   Reid Monaco is a 60 year old male who presents with headache, nausea, and vomiting.  Patient reports that this morning at 1 AM he woke up to intense abdominal pain.  This abdominal pain is not exacerbated by anything, constant, nonradiating and primary located in the midline low abdomen.  Then throughout the day he has been feeling nauseous and vomiting.  1 hour ago, while vomiting, the patient had a sudden onset of posterior headache in which it reaches maximal intensity within seconds.  It has been constant, severe since that time.  Patient also mentions that he feels like he is constipated. No difficulty urinating or dysuria.    Review of Systems   Constitutional: Negative for fever.   Gastrointestinal: Positive for abdominal distention, abdominal pain, nausea and vomiting.   Genitourinary: Negative for difficulty urinating and dysuria.   Neurological: Positive for headaches.   All other systems reviewed and are negative.    Medications:  Aspirin 81 mg  Lofibra    Past Medical History:    Hypertriglyceridemia      Past Surgical History:    Vasectomy     Family History:    Heart disease (Mother)  Colorectal cancer (Father)  Hypertension (Siblings)    Social History:  Presents to ED from home.  PCP: Fady Castro    Physical Exam     Patient Vitals for the past 24 hrs:   BP Temp Pulse Resp SpO2   02/13/21 1545 (!) 147/89 -- -- -- 100 %   02/13/21 1530 (!) 143/77 -- -- -- 97 %   02/13/21 1450 (!) 161/97 -- -- -- 99 %   02/13/21 1436 (!) 138/92 98.2  F (36.8  C) 89 20 99 %       Physical Exam      HEENT:   External ears are normal.     Temporal arteries are non-tender.      Oropharynx is moist, without lesions or trismus.  Eyes:   PERRL.  EOMs intact.      No corneal clouding.   NECK:   Supple, no meningismus.       Negative Brudzinski's sign.  CV:    Regular rate and rhythm.    No murmurs, rubs or gallops.  PULM:   Clear to auscultation bilateral.      No  respiratory distress.      No stridor or wheezing.  ABD:  Soft, non-distended.      No reproducible abdominal pain    No rigidity or pulsatile mass    No rebound or guarding.    No CVA tenderness  MSK:    No gross deformity to all four extremities.      No significant joint effusions.  LYMPH:  No cervical lymphadenopathy.  NEURO:  A & O x 3    CN II-XII intact, speech is clear with no aphasia.      Finger to nose within normal limits.  No pronator drift.      Strength is 5/5 in all 4 extremities.  Sensation is intact.      Normal muscular tone, no tremor.  SKIN:   Warm, dry and intact.    PSYCH:   Mood is depressed and affect is appropriate.      Emergency Department Course   Imaging:  Head CT w/o contrast   No acute intracranial abnormality.  Reading per radiology    Abd/pelvis CT no contrast - Stone Protocol  No acute cause for pain demonstrated.  Reading per radiology     Laboratory:  CBC: WBC 6.6, HGB 17.1,   BMP: Glucose 112 (H) o/w WNL (Creatinine 1.00)    Glucose by meter: 137 (H)    UA: pH 7.5 (H), Mucous present (A), Amorphous crystals few (A) o/w Negative     Emergency Department Course:  Reviewed:  1443 I reviewed the patient's nursing notes, vitals, past medical records, Care Everywhere.     Assessments:  1448 I performed an exam of the patient as documented above.   1605 I rechecked the patient and reevaluated their symptoms. Patient has no more headache, abdominal pain, or nausea.     Interventions:  1532 Reglan 20 mg IV  1532 Benadryl 25 mg IV    Disposition:  The patient was discharged to home.     Impression & Plan   Medical Decision Makin-year-old male presented to the ED with 12 hours of midline low abdominal discomfort associated with nausea and vomiting.  His abdominal examination was benign with no significant reproducible tenderness on examination.  Basic laboratory studies were unremarkable.  No evidence of UTI.  CT scan of the abdomen pelvis undertaken and unremarkable for  acute disease.  He is now abdominal pain-free on reexam.  Differential diagnosis would include viral illness, enteritis, mesenteric adenitis, recently passed ureteral stone and less likely IBS or IBD.  Low suspicion for vascular event that would require CT scan with IV contrast especially given the absence of ongoing pain.    Patient also had a severe headache that began abruptly after vomiting.  Neurological examination unremarkable.  No features concerning for intracranial mass, dural sinus thrombosis, temporal arteritis, meningitis, glaucoma.  Primary concern today was for subarachnoid hemorrhage.  CT scan of the head negative for acute pathology.  Given CT scan was taken within the first 2 hours since onset of headache, no indication for CTA or lumbar puncture.  Patient has no residual headache after single dose of Reglan and Benadryl.  Patient safe for discharge home and close follow-up with PCP.    Diagnosis:    ICD-10-CM    1. Abdominal pain, generalized  R10.84    2. Acute nonintractable headache, unspecified headache type  R51.9        Discharge Medications:  New Prescriptions    DICYCLOMINE (BENTYL) 20 MG TABLET    Take 1 tablet (20 mg) by mouth 4 times daily as needed (abdominal pain)    METOCLOPRAMIDE (REGLAN) 10 MG TABLET    Take 1 tablet (10 mg) by mouth 4 times daily as needed (headache or nausea)       Scribe Disclosure:  I, Alonso Hermosillo, am serving as a scribe at 2:43 PM on 2/13/2021 to document services personally performed by Corky Gallego MD based on my observations and the provider's statements to me.      Corky Gallego MD  02/13/21 1651

## 2021-02-14 NOTE — ED TRIAGE NOTES
Pt discharged from ED at 1647 today. Pt c/o continued nausea and vomiting since discharge. Pt attempted to take discharge medication but vomited it up. Also c/o HA. ABC in tact. A/OX4

## 2021-02-14 NOTE — ED PROVIDER NOTES
History   Chief Complaint:  Nausea & Vomiting       The history is provided by the patient.      Reid Monaco is a 60 year old male who presents with nausea and vomiting. Patient was seen earlier today in ER for similar symptoms. His pain returned once he went home. See imaging and laboratory results below. Since discharged, patient reports discomfort in his lower abdomen and rates it a 7/10. He denies past abdominal surgeries. Denies sick contacts.     Imaging:  Head CT w/o contrast   No acute intracranial abnormality.  Reading per radiology     Abd/pelvis CT no contrast - Stone Protocol  No acute cause for pain demonstrated.  Reading per radiology      Laboratory:  CBC: WBC 6.6, HGB 17.1,   BMP: Glucose 112 (H) o/w WNL (Creatinine 1.00)  Glucose by meter: 137 (H)  UA: pH 7.5 (H), Mucous present (A), Amorphous crystals few (A) o/w Negative     Review of Systems   Gastrointestinal: Positive for abdominal pain, nausea and vomiting.   All other systems reviewed and are negative.    Allergies:   No known drug allergies.     Medications:  Aspirin 81mg  Lofibra     Past Medical History:    Hypertriglyceridemia       Past Surgical History:    Vasectomy      Family History:    Heart disease (Mother)  Colorectal cancer (Father)  Hypertension (Siblings)     Social History:  Presents to ED from home.  PCP: Fady Castro    Physical Exam     Patient Vitals for the past 24 hrs:   Temp Temp src Pulse Resp SpO2 Weight   02/13/21 2001 97.7  F (36.5  C) Temporal 83 18 99 % 83.9 kg (185 lb)       Physical Exam     Constitutional: Alert, attentive, GCS 15  HENT:    Nose: Nose normal.    Mouth/Throat: Oropharynx is clear, mucous membranes are moist  Eyes: EOM are normal, anicteric, conjugate gaze  CV: regular rate and rhythm; no murmurs  Chest: Effort normal and breath sounds clear without wheezing or rales, symmetric bilaterally   GI: Mild midline lower abdominal tenderness, otherwise no distention, no guarding or  rebound.  MSK: No LE edema, no tenderness to palpation of BLE.  Neurological: Alert, attentive, moving all extremities equally.   Skin: Skin is warm and dry.    Emergency Department Course     Laboratory:  CBC: WBC 6.7, HGB 16.0,      Emergency Department Course:    Reviewed:  I reviewed nursing notes, vitals and past medical history    Assessments:   I obtained history and examined the patient as noted above.    I rechecked the patient and explained findings.     Interventions:    NS, 1 L, IV   Zofran 4mg IV   Toradol 15mg IV    Disposition:  The patient was discharged to home.     Impression & Plan     Medical Decision Makin-year-old male presenting for evaluation of lower abdominal pain and nausea/vomiting for which she was seen in the emergency department several hours prior and underwent negative lab testing and CT imaging of his abdomen and head, the latter done after reported supposed emesis headache.  On his initial visit he was improved with Reglan and Benadryl however when he went home his nausea and vomiting as well as his abdominal pain returned.  On arrival here, his abdominal exam remains benign with low suspicion of hollow viscus perforation, obstruction or infection, he does not have any evidence of kidney stone on CT imaging.  He does not have any change in his urinary stream with low suspicion for prostatitis he has no testicular pain.  His symptoms are readily improved with IV fluids, Zofran and he was able to tolerate p.o.  He denies any chest symptoms with low suspicion for atypical presentation of ACS.  I will switch his antiemetics to Zofran ODT, recommend a trial of Bentyl and follow-up with PCP.  Return precautions were reviewed and he was discharged home.    Diagnosis:    ICD-10-CM    1. Non-intractable vomiting with nausea, unspecified vomiting type  R11.2    2. Generalized abdominal pain  R10.84        Discharge Medications:  New Prescriptions     DICYCLOMINE (BENTYL) 20 MG TABLET    Take 1 tablet (20 mg) by mouth 2 times daily for 10 days    ONDANSETRON (ZOFRAN ODT) 4 MG ODT TAB    Take 1 tablet (4 mg) by mouth every 8 hours as needed for nausea     Joe Hernandez MD  [employer]  12:04 AM 02/14/21       Scribe Disclosure:  I, Merly Olsonnis, am serving as a scribe at 8:07 PM on 2/13/2021 to document services personally performed by Joe Hernandez  based on my observations and the provider's statements to me.          Joe Hernandez MD  02/14/21 0004

## 2021-02-14 NOTE — TELEPHONE ENCOUNTER
Reason for Disposition    [1] Recent medical visit within 24 hours AND [2] condition/symptoms WORSE    Additional Information    Negative: Shock suspected (e.g., cold/pale/clammy skin, too weak to stand, low BP, rapid pulse)    Negative: Difficult to awaken or acting confused (e.g., disoriented, slurred speech)    Negative: Sounds like a life-threatening emergency to the triager    Negative: [1] Drinking very little AND [2] dehydration suspected (e.g., no urine > 12 hours, very dry mouth, very lightheaded)    Negative: Patient sounds very sick or weak to the triager    Negative: Recent (within last 24 hours) medical visit for an injury    Negative: Recent surgery or surgical procedure    Negative: Recent discharge from the hospital    Negative: Asthma attack diagnosed recently    Negative: Flu (influenza) diagnosed recently    Negative: Ear infection (otitis media, middle ear infection) diagnosed recently    Negative: Ear infection (otitis externa, swimmer's ear) diagnosed recently    Negative: [1] Sinus infection AND [2] taking an antibiotic    Negative: Skin infection (cellulitis) diagnosed recently    Negative: Strep throat (strep pharyngitis) diagnosed recently    Negative: Threatened miscarriage (threatened ) recently diagnosed    Negative: Urine infection (FEMALE; cystitis, pyelonephritis, urethritis) ) diagnosed recently    Negative: Urine infection (MALE; cystitis, pyelonephritis, prostatitis, epidydimitis, orchitis, urethritis) diagnosed recently    Negative: Taking antibiotic for other infection    Negative: More than 24 hours since medical visit    Negative: Fever > 104 F (40 C)    Negative: [1] SEVERE pain (e.g., excruciating, pain scale 8-10) AND [2] not improved after pain medications    Protocols used: RECENT MEDICAL VISIT FOR ILLNESS FOLLOW-UP CALL-A-

## 2021-02-14 NOTE — TELEPHONE ENCOUNTER
Patient was in the ER earlier today with abdominal pain, headache, nausea/vomiting. Tonight his symptoms have returned. Is vomiting and can't tolerate any PO. Advised per discharge instructions to return to the ER for evaluation.     Lizeth Foote RN/[facility]

## 2021-02-14 NOTE — DISCHARGE INSTRUCTIONS
You should make an appointment to follow-up with your primary doctor for recheck.  You can try the Zofran as needed for your nausea and the Bentyl to see if this helps with your discomfort.  You should return emergency department should you have worsening pain, have blood in your stool, blood in your urine, develop fever chest pain, chest pressure or shortness of breath.

## 2021-02-18 ENCOUNTER — OFFICE VISIT (OUTPATIENT)
Dept: INTERNAL MEDICINE | Facility: CLINIC | Age: 61
End: 2021-02-18
Payer: COMMERCIAL

## 2021-02-18 VITALS
HEART RATE: 67 BPM | WEIGHT: 186.6 LBS | DIASTOLIC BLOOD PRESSURE: 68 MMHG | RESPIRATION RATE: 16 BRPM | TEMPERATURE: 97.5 F | SYSTOLIC BLOOD PRESSURE: 106 MMHG | HEIGHT: 69 IN | OXYGEN SATURATION: 97 % | BODY MASS INDEX: 27.64 KG/M2

## 2021-02-18 DIAGNOSIS — A08.4 VIRAL GASTROENTERITIS: ICD-10-CM

## 2021-02-18 PROCEDURE — 99203 OFFICE O/P NEW LOW 30 MIN: CPT | Performed by: NURSE PRACTITIONER

## 2021-02-18 ASSESSMENT — MIFFLIN-ST. JEOR: SCORE: 1641.79

## 2021-02-18 NOTE — PROGRESS NOTES
"    Assessment & Plan     (A08.4) Viral gastroenteritis  Comment:   Plan: push fluids, diet as tolerated.                       Anita Solorzano NP  [facility] CADENCE Vincent is a 61 year old who presents for the following health issues     HPI       ED/UC Followup:    Facility:  [facility] ER  Date of visit: 02/13/21, 2 visits  Reason for visit: abdominal pain, headache, nausea and vomiting  Current Status: stable,  Denies abd pain, n/v/d/c, minor weakness.           Review of Systems   Constitutional, HEENT, cardiovascular, pulmonary, gi and gu systems are negative, except as otherwise noted.      Objective    /68 (BP Location: Right arm, Patient Position: Sitting, Cuff Size: Adult Large)   Pulse 67   Temp 97.5  F (36.4  C) (Oral)   Resp 16   Ht 1.753 m (5' 9\")   Wt 84.6 kg (186 lb 9.6 oz)   SpO2 97%   BMI 27.56 kg/m    Body mass index is 27.56 kg/m .  Physical Exam   GENERAL: healthy, alert and no distress  NECK: no adenopathy, no asymmetry, masses, or scars and thyroid normal to palpation  RESP: lungs clear to auscultation - no rales, rhonchi or wheezes  CV: regular rate and rhythm, normal S1 S2, no S3 or S4, no murmur, click or rub, no peripheral edema and peripheral pulses strong  ABDOMEN: soft, nontender, no hepatosplenomegaly, no masses and bowel sounds normal  PSYCH: mentation appears normal, affect normal/bright                "

## 2021-02-18 NOTE — NURSING NOTE
"[facility] ER 02/13/21 two different times. Abdominal pain, nausea and vomiting.  Vital signs:  Temp: 97.5  F (36.4  C) Temp src: Oral BP: 106/68 Pulse: 67   Resp: 16 SpO2: 97 %     Height: 175.3 cm (5' 9\") Weight: 84.6 kg (186 lb 9.6 oz)  Estimated body mass index is 27.56 kg/m  as calculated from the following:    Height as of this encounter: 1.753 m (5' 9\").    Weight as of this encounter: 84.6 kg (186 lb 9.6 oz).          "

## 2021-04-23 ENCOUNTER — MYC MEDICAL ADVICE (OUTPATIENT)
Dept: FAMILY MEDICINE | Facility: CLINIC | Age: 61
End: 2021-04-23

## 2021-04-27 NOTE — TELEPHONE ENCOUNTER
We could schedule a telephone visit to discuss lower urinary tract symptoms, possibly recheck PSA and consider medication to help with symptoms if indicated given enlarged prostate on imaging. I don't think the calcification in the liver requires any follow up.    Fady Castro DO  4/27/2021 8:37 AM

## 2021-07-07 ENCOUNTER — MYC MEDICAL ADVICE (OUTPATIENT)
Dept: FAMILY MEDICINE | Facility: CLINIC | Age: 61
End: 2021-07-07

## 2021-07-07 NOTE — TELEPHONE ENCOUNTER
Updated imm     Please see my chart message below     Please review and advise     Thank you     Sridevi Amos RN, BSN  [facility]

## 2021-09-14 ENCOUNTER — OFFICE VISIT (OUTPATIENT)
Dept: FAMILY MEDICINE | Facility: CLINIC | Age: 61
End: 2021-09-14
Payer: COMMERCIAL

## 2021-09-14 VITALS
DIASTOLIC BLOOD PRESSURE: 72 MMHG | OXYGEN SATURATION: 98 % | SYSTOLIC BLOOD PRESSURE: 106 MMHG | HEART RATE: 66 BPM | TEMPERATURE: 97.8 F | HEIGHT: 69 IN | WEIGHT: 188 LBS | BODY MASS INDEX: 27.85 KG/M2

## 2021-09-14 DIAGNOSIS — Z12.5 SCREENING FOR PROSTATE CANCER: ICD-10-CM

## 2021-09-14 DIAGNOSIS — E78.1 HYPERTRIGLYCERIDEMIA: Primary | ICD-10-CM

## 2021-09-14 DIAGNOSIS — Z13.1 SCREENING FOR DIABETES MELLITUS: ICD-10-CM

## 2021-09-14 LAB
ALBUMIN SERPL-MCNC: 3.8 G/DL (ref 3.4–5)
ALP SERPL-CCNC: 43 U/L (ref 40–150)
ALT SERPL W P-5'-P-CCNC: 21 U/L (ref 0–70)
ANION GAP SERPL CALCULATED.3IONS-SCNC: 4 MMOL/L (ref 3–14)
AST SERPL W P-5'-P-CCNC: 15 U/L (ref 0–45)
BILIRUB SERPL-MCNC: 0.6 MG/DL (ref 0.2–1.3)
BUN SERPL-MCNC: 23 MG/DL (ref 7–30)
CALCIUM SERPL-MCNC: 8.6 MG/DL (ref 8.5–10.1)
CHLORIDE BLD-SCNC: 106 MMOL/L (ref 94–109)
CHOLEST SERPL-MCNC: 154 MG/DL
CO2 SERPL-SCNC: 29 MMOL/L (ref 20–32)
CREAT SERPL-MCNC: 1.22 MG/DL (ref 0.66–1.25)
FASTING STATUS PATIENT QL REPORTED: YES
GFR SERPL CREATININE-BSD FRML MDRD: 64 ML/MIN/1.73M2
GLUCOSE BLD-MCNC: 97 MG/DL (ref 70–99)
HDLC SERPL-MCNC: 41 MG/DL
LDLC SERPL CALC-MCNC: 93 MG/DL
NONHDLC SERPL-MCNC: 113 MG/DL
POTASSIUM BLD-SCNC: 4.3 MMOL/L (ref 3.4–5.3)
PROT SERPL-MCNC: 7.3 G/DL (ref 6.8–8.8)
PSA SERPL-MCNC: 2 UG/L (ref 0–4)
SODIUM SERPL-SCNC: 139 MMOL/L (ref 133–144)
TRIGL SERPL-MCNC: 101 MG/DL

## 2021-09-14 PROCEDURE — 36415 COLL VENOUS BLD VENIPUNCTURE: CPT | Performed by: FAMILY MEDICINE

## 2021-09-14 PROCEDURE — 99213 OFFICE O/P EST LOW 20 MIN: CPT | Performed by: FAMILY MEDICINE

## 2021-09-14 PROCEDURE — G0103 PSA SCREENING: HCPCS | Performed by: FAMILY MEDICINE

## 2021-09-14 PROCEDURE — 80061 LIPID PANEL: CPT | Performed by: FAMILY MEDICINE

## 2021-09-14 PROCEDURE — 80053 COMPREHEN METABOLIC PANEL: CPT | Performed by: FAMILY MEDICINE

## 2021-09-14 RX ORDER — FENOFIBRATE 160 MG/1
160 TABLET ORAL DAILY
Qty: 90 TABLET | Refills: 3 | Status: SHIPPED | OUTPATIENT
Start: 2021-09-14 | End: 2022-10-10

## 2021-09-14 ASSESSMENT — MIFFLIN-ST. JEOR: SCORE: 1648.14

## 2021-09-14 NOTE — PROGRESS NOTES
"    Assessment & Plan     Hypertriglyceridemia: Medication working well.  Refill signed.  Will recheck lipid panel.  - Lipid panel reflex to direct LDL Fasting; Future  - fenofibrate (TRIGLIDE/LOFIBRA) 160 MG tablet; Take 1 tablet (160 mg) by mouth daily  - Lipid panel reflex to direct LDL Fasting    Screening for diabetes mellitus  - Comprehensive metabolic panel (BMP + Alb, Alk Phos, ALT, AST, Total. Bili, TP); Future  - Comprehensive metabolic panel (BMP + Alb, Alk Phos, ALT, AST, Total. Bili, TP)    Screening for prostate cancer  - PSA, screen; Future  - PSA, screen         BMI:   Estimated body mass index is 27.76 kg/m  as calculated from the following:    Height as of this encounter: 1.753 m (5' 9\").    Weight as of this encounter: 85.3 kg (188 lb).         Return in about 1 year (around 9/14/2022) for follow up.    Fady Castro DO  [facility]    Escobar Vincent is a 61 year old who presents for the following health issues     History of Present Illness       He eats 2-3 servings of fruits and vegetables daily.He consumes 0 sweetened beverage(s) daily.He exercises with enough effort to increase his heart rate 60 or more minutes per day.  He exercises with enough effort to increase his heart rate 7 days per week.   He is taking medications regularly.  He walks/hikes with dog on a daily basis.       Hyperlipidemia Follow-Up    Are you regularly taking any medication or supplement to lower your cholesterol?   Yes- finofibrate    Are you having muscle aches or other side effects that you think could be caused by your cholesterol lowering medication?  No           Review of Systems   Constitutional, HEENT, cardiovascular, pulmonary, gi and gu systems are negative, except as otherwise noted.      Objective    /72 (BP Location: Right arm, Cuff Size: Adult Regular)   Pulse 66   Temp 97.8  F (36.6  C) (Tympanic)   Ht 1.753 m (5' 9\")   Wt 85.3 kg (188 lb)   SpO2 98%   BMI 27.76 " kg/m    Body mass index is 27.76 kg/m .  Physical Exam   GENERAL: healthy, alert and no distress  HENT: ear canals and TM's normal, nose and mouth without ulcers or lesions  RESP: lungs clear to auscultation - no rales, rhonchi or wheezes  CV: regular rate and rhythm, normal S1 S2, no S3 or S4, no murmur, click or rub, no peripheral edema and peripheral pulses strong  PSYCH: mentation appears normal, affect normal/bright

## 2021-09-25 ENCOUNTER — HEALTH MAINTENANCE LETTER (OUTPATIENT)
Age: 61
End: 2021-09-25

## 2021-11-20 ENCOUNTER — MYC MEDICAL ADVICE (OUTPATIENT)
Dept: FAMILY MEDICINE | Facility: CLINIC | Age: 61
End: 2021-11-20
Payer: COMMERCIAL

## 2021-11-20 ENCOUNTER — HEALTH MAINTENANCE LETTER (OUTPATIENT)
Age: 61
End: 2021-11-20

## 2021-12-12 ENCOUNTER — MYC MEDICAL ADVICE (OUTPATIENT)
Dept: FAMILY MEDICINE | Facility: CLINIC | Age: 61
End: 2021-12-12
Payer: COMMERCIAL

## 2021-12-16 NOTE — TELEPHONE ENCOUNTER
Per [employer]: Fenofibrate  Common    Gastrointestinal: Abdominal pain (4.6% ), Nausea (2.3% )    Hepatic: Aspartate aminotransferase serum level raised (3.4% ), Liver function tests abnormal (7.5%; 3x ULN or greater, 13% with fenofibrate 87 mg to 130 mg, 0% with fenofibrate 43 mg or less )    Musculoskeletal: Backache (3.4% )    Respiratory: Rhinitis (2.3% )    Routing to PCP to review and advise.    Heath GUADALUPE RN   [facility]

## 2022-02-02 ENCOUNTER — OFFICE VISIT (OUTPATIENT)
Dept: FAMILY MEDICINE | Facility: CLINIC | Age: 62
End: 2022-02-02

## 2022-02-02 VITALS
OXYGEN SATURATION: 99 % | WEIGHT: 188 LBS | SYSTOLIC BLOOD PRESSURE: 122 MMHG | HEIGHT: 69 IN | HEART RATE: 62 BPM | DIASTOLIC BLOOD PRESSURE: 72 MMHG | TEMPERATURE: 97.9 F | BODY MASS INDEX: 27.85 KG/M2

## 2022-02-02 DIAGNOSIS — J02.9 SORE THROAT: Primary | ICD-10-CM

## 2022-02-02 LAB
COVID-19: NEGATIVE
S PYO AG THROAT QL IA.RAPID: NEGATIVE
STREP A: NEGATIVE

## 2022-02-02 PROCEDURE — G2023 SPECIMEN COLLECT COVID-19: HCPCS | Performed by: FAMILY MEDICINE

## 2022-02-02 PROCEDURE — 99213 OFFICE O/P EST LOW 20 MIN: CPT | Performed by: FAMILY MEDICINE

## 2022-02-02 PROCEDURE — 87635 SARS-COV-2 COVID-19 AMP PRB: CPT | Performed by: FAMILY MEDICINE

## 2022-02-02 PROCEDURE — 87651 STREP A DNA AMP PROBE: CPT | Performed by: FAMILY MEDICINE

## 2022-02-02 PROCEDURE — 87880 STREP A ASSAY W/OPTIC: CPT | Performed by: FAMILY MEDICINE

## 2022-02-02 ASSESSMENT — MIFFLIN-ST. JEOR: SCORE: 1648.14

## 2022-02-02 NOTE — PROGRESS NOTES
"Assessment & Plan   Problem List Items Addressed This Visit     None      Visit Diagnoses     Sore throat    -  Primary    Relevant Orders    Rapid Strep Screen ([facility]) (Completed)    COVID-19 ([facility]) (Completed)    Strep A ([facility]) (Completed)         Results for orders placed or performed in visit on 02/02/22   Rapid Strep Screen (BFP)     Status: None   Result Value Ref Range    Rapid Strep A Screen Negative Negative   COVID-19 (BFP)     Status: None   Result Value Ref Range    COVID-19 Negative    Strep A (BFP)     Status: None   Result Value Ref Range    STREP A Negative Negative     1. Sore throat  Testing today was negative. He is feeling better, likely viral infection. Continue to treat symptoms and followup as needed.  - Rapid Strep Screen (BFP)  - COVID-19 ([facility]FP)  - Strep A (BFP)           BMI:   Estimated body mass index is 27.76 kg/m  as calculated from the following:    Height as of this encounter: 1.753 m (5' 9\").    Weight as of this encounter: 85.3 kg (188 lb).         FUTURE APPOINTMENTS:       - Follow-up visit as needed.    No follow-ups on file.    Chloe Mason MD  [facility]    Subjective     Nursing Notes:   Leni Gonzalez CMA  2/2/2022 10:37 AM  Signed  Chief Complaint   Patient presents with     Covid Concern     symptoms started on thursday, post-nasal drip, throat irritation, coughing, mild headache, congestion, chest discomfort, worse in the evening, feels it is slihgtly better today      Pre-visit Screening:  Immunizations:  up to date  Colonoscopy:  is up to date  Mammogram: NA  Asthma Action Test/Plan:  NA  PHQ9:  NA  GAD7:  NA  Questioned patient about current smoking habits Pt. Quit smoking.  Ok to leave detailed message on voice mail for today's visit only Yes, phone # [phone]           Reid Monaco is a 61 year old male who presents to clinic today for the following health issues   HPI     Has had 2 covid vaccines, no booster.    Sore throat, worried about " "strep. Had a chest cold, drainage.  Daughter had the same thing last week, neg covid test.  He didn't test for covid. Sx started on Thursday.  No fever, has had a lot of coughing and chest congestion. No shortness of breath. A lot of postnasal drainage.      Review of Systems   Constitutional, HEENT, cardiovascular, pulmonary, gi and gu systems are negative, except as otherwise noted.      Objective    /72 (BP Location: Left arm, Patient Position: Sitting, Cuff Size: Adult Large)   Pulse 62   Temp 97.9  F (36.6  C) (Temporal)   Ht 1.753 m (5' 9\")   Wt 85.3 kg (188 lb)   SpO2 99%   BMI 27.76 kg/m    Body mass index is 27.76 kg/m .  Physical Exam   GENERAL: healthy, alert and no distress  EYES: Eyes grossly normal to inspection, PERRL and conjunctivae and sclerae normal  HENT: ear canals and TM's normal, nose and mouth without ulcers or lesions  RESP: lungs clear to auscultation - no rales, rhonchi or wheezes  CV: regular rate and rhythm, normal S1 S2, no S3 or S4, no murmur, click or rub, no peripheral edema and peripheral pulses strong  ABDOMEN: soft, nontender, no hepatosplenomegaly, no masses and bowel sounds normal  MS: no gross musculoskeletal defects noted, no edema  NEURO: Normal strength and tone, mentation intact and speech normal  PSYCH: mentation appears normal, affect normal/bright    Results for orders placed or performed in visit on 02/02/22   Rapid Strep Screen (BFP)     Status: None   Result Value Ref Range    Rapid Strep A Screen Negative Negative   COVID-19 (BFP)     Status: None   Result Value Ref Range    COVID-19 Negative    Strep A (BFP)     Status: None   Result Value Ref Range    STREP A Negative Negative         "

## 2022-02-02 NOTE — PATIENT INSTRUCTIONS
"Assessment & Plan   Problem List Items Addressed This Visit     None      Visit Diagnoses     Sore throat    -  Primary    Relevant Orders    Rapid Strep Screen (BFP) (Completed)    COVID-19 ([facility]FP) (Completed)    Strep A ([facility]FP) (Completed)         Results for orders placed or performed in visit on 02/02/22   Rapid Strep Screen (BFP)     Status: None   Result Value Ref Range    Rapid Strep A Screen Negative Negative   COVID-19 (BFP)     Status: None   Result Value Ref Range    COVID-19 Negative    Strep A (BFP)     Status: None   Result Value Ref Range    STREP A Negative Negative     1. Sore throat  Testing today was negative. He is feeling better, likely viral infection. Continue to treat symptoms and followup as needed.  - Rapid Strep Screen (BFP)  - COVID-19 (BFP)  - Strep A (BFP)           BMI:   Estimated body mass index is 27.76 kg/m  as calculated from the following:    Height as of this encounter: 1.753 m (5' 9\").    Weight as of this encounter: 85.3 kg (188 lb).         FUTURE APPOINTMENTS:       - Follow-up visit as needed.    No follow-ups on file.    Chloe Mason MD  [facility]  "

## 2022-02-02 NOTE — NURSING NOTE
Chief Complaint   Patient presents with     Covid Concern     symptoms started on thursday, post-nasal drip, throat irritation, coughing, mild headache, congestion, chest discomfort, worse in the evening, feels it is slihgtly better today      Pre-visit Screening:  Immunizations:  up to date  Colonoscopy:  is up to date  Mammogram: NA  Asthma Action Test/Plan:  NA  PHQ9:  NA  GAD7:  NA  Questioned patient about current smoking habits Pt. Quit smoking.  Ok to leave detailed message on voice mail for today's visit only Yes, phone # [phone]

## 2022-07-15 ENCOUNTER — HOSPITAL ENCOUNTER (EMERGENCY)
Facility: CLINIC | Age: 62
Discharge: HOME | End: 2022-07-15
Attending: EMERGENCY MEDICINE | Admitting: EMERGENCY MEDICINE
Payer: COMMERCIAL

## 2022-07-15 ENCOUNTER — NURSE TRIAGE (OUTPATIENT)
Dept: FAMILY MEDICINE | Facility: CLINIC | Age: 62
End: 2022-07-15

## 2022-07-15 ENCOUNTER — APPOINTMENT (OUTPATIENT)
Dept: GENERAL RADIOLOGY | Facility: CLINIC | Age: 62
End: 2022-07-15
Attending: EMERGENCY MEDICINE
Payer: COMMERCIAL

## 2022-07-15 VITALS
WEIGHT: 200 LBS | HEART RATE: 49 BPM | HEIGHT: 69 IN | RESPIRATION RATE: 20 BRPM | OXYGEN SATURATION: 98 % | SYSTOLIC BLOOD PRESSURE: 126 MMHG | BODY MASS INDEX: 29.62 KG/M2 | DIASTOLIC BLOOD PRESSURE: 78 MMHG | TEMPERATURE: 96.6 F

## 2022-07-15 DIAGNOSIS — R06.02 SOB (SHORTNESS OF BREATH): ICD-10-CM

## 2022-07-15 DIAGNOSIS — R07.89 OTHER CHEST PAIN: ICD-10-CM

## 2022-07-15 LAB
ALBUMIN SERPL-MCNC: 3.8 G/DL (ref 3.4–5)
ALP SERPL-CCNC: 43 U/L (ref 40–150)
ALT SERPL W P-5'-P-CCNC: 32 U/L (ref 0–70)
ANION GAP SERPL CALCULATED.3IONS-SCNC: 2 MMOL/L (ref 3–14)
AST SERPL W P-5'-P-CCNC: 21 U/L (ref 0–45)
ATRIAL RATE - MUSE: 58 BPM
BASOPHILS # BLD AUTO: 0 10E3/UL (ref 0–0.2)
BASOPHILS NFR BLD AUTO: 1 %
BILIRUB SERPL-MCNC: 0.9 MG/DL (ref 0.2–1.3)
BUN SERPL-MCNC: 21 MG/DL (ref 7–30)
CALCIUM SERPL-MCNC: 9.1 MG/DL (ref 8.5–10.1)
CHLORIDE BLD-SCNC: 110 MMOL/L (ref 94–109)
CO2 SERPL-SCNC: 28 MMOL/L (ref 20–32)
CREAT SERPL-MCNC: 0.95 MG/DL (ref 0.66–1.25)
D DIMER PPP FEU-MCNC: 0.41 UG/ML FEU (ref 0–0.5)
DIASTOLIC BLOOD PRESSURE - MUSE: NORMAL MMHG
EOSINOPHIL # BLD AUTO: 0.1 10E3/UL (ref 0–0.7)
EOSINOPHIL NFR BLD AUTO: 3 %
ERYTHROCYTE [DISTWIDTH] IN BLOOD BY AUTOMATED COUNT: 12.3 % (ref 10–15)
GFR SERPL CREATININE-BSD FRML MDRD: 90 ML/MIN/1.73M2
GLUCOSE BLD-MCNC: 100 MG/DL (ref 70–99)
HCT VFR BLD AUTO: 48.4 % (ref 40–53)
HGB BLD-MCNC: 15.9 G/DL (ref 13.3–17.7)
IMM GRANULOCYTES # BLD: 0 10E3/UL
IMM GRANULOCYTES NFR BLD: 0 %
INTERPRETATION ECG - MUSE: NORMAL
LIPASE SERPL-CCNC: 181 U/L (ref 73–393)
LYMPHOCYTES # BLD AUTO: 1.4 10E3/UL (ref 0.8–5.3)
LYMPHOCYTES NFR BLD AUTO: 32 %
MCH RBC QN AUTO: 28.3 PG (ref 26.5–33)
MCHC RBC AUTO-ENTMCNC: 32.9 G/DL (ref 31.5–36.5)
MCV RBC AUTO: 86 FL (ref 78–100)
MONOCYTES # BLD AUTO: 0.4 10E3/UL (ref 0–1.3)
MONOCYTES NFR BLD AUTO: 10 %
NEUTROPHILS # BLD AUTO: 2.4 10E3/UL (ref 1.6–8.3)
NEUTROPHILS NFR BLD AUTO: 54 %
NRBC # BLD AUTO: 0 10E3/UL
NRBC BLD AUTO-RTO: 0 /100
P AXIS - MUSE: 63 DEGREES
PLATELET # BLD AUTO: 268 10E3/UL (ref 150–450)
POTASSIUM BLD-SCNC: 4 MMOL/L (ref 3.4–5.3)
PR INTERVAL - MUSE: 154 MS
PROT SERPL-MCNC: 7.2 G/DL (ref 6.8–8.8)
QRS DURATION - MUSE: 90 MS
QT - MUSE: 396 MS
QTC - MUSE: 388 MS
R AXIS - MUSE: -14 DEGREES
RBC # BLD AUTO: 5.61 10E6/UL (ref 4.4–5.9)
SODIUM SERPL-SCNC: 140 MMOL/L (ref 133–144)
SYSTOLIC BLOOD PRESSURE - MUSE: NORMAL MMHG
T AXIS - MUSE: 15 DEGREES
TROPONIN I SERPL HS-MCNC: 6 NG/L
VENTRICULAR RATE- MUSE: 58 BPM
WBC # BLD AUTO: 4.5 10E3/UL (ref 4–11)

## 2022-07-15 PROCEDURE — 80053 COMPREHEN METABOLIC PANEL: CPT | Performed by: EMERGENCY MEDICINE

## 2022-07-15 PROCEDURE — 83690 ASSAY OF LIPASE: CPT | Performed by: EMERGENCY MEDICINE

## 2022-07-15 PROCEDURE — 71046 X-RAY EXAM CHEST 2 VIEWS: CPT

## 2022-07-15 PROCEDURE — 85379 FIBRIN DEGRADATION QUANT: CPT | Performed by: EMERGENCY MEDICINE

## 2022-07-15 PROCEDURE — 99285 EMERGENCY DEPT VISIT HI MDM: CPT | Mod: 25

## 2022-07-15 PROCEDURE — 85004 AUTOMATED DIFF WBC COUNT: CPT | Performed by: EMERGENCY MEDICINE

## 2022-07-15 PROCEDURE — 84484 ASSAY OF TROPONIN QUANT: CPT | Performed by: EMERGENCY MEDICINE

## 2022-07-15 PROCEDURE — 250N000013 HC RX MED GY IP 250 OP 250 PS 637: Performed by: EMERGENCY MEDICINE

## 2022-07-15 PROCEDURE — 93005 ELECTROCARDIOGRAM TRACING: CPT

## 2022-07-15 PROCEDURE — 36415 COLL VENOUS BLD VENIPUNCTURE: CPT | Performed by: EMERGENCY MEDICINE

## 2022-07-15 RX ORDER — ASPIRIN 81 MG/1
324 TABLET, CHEWABLE ORAL ONCE
Status: COMPLETED | OUTPATIENT
Start: 2022-07-15 | End: 2022-07-15

## 2022-07-15 RX ADMIN — ASPIRIN 81 MG CHEWABLE TABLET 324 MG: 81 TABLET CHEWABLE at 09:16

## 2022-07-15 ASSESSMENT — ENCOUNTER SYMPTOMS
ARTHRALGIAS: 1
DIAPHORESIS: 1
NERVOUS/ANXIOUS: 1
ABDOMINAL PAIN: 0
VOMITING: 0
SHORTNESS OF BREATH: 1

## 2022-07-15 NOTE — ED PROVIDER NOTES
History   Chief Complaint:  Chest Pain and Shortness of Breath       The history is provided by the patient.      Reid Monaco is a 62 year old male with history of GI cancer who presents with chest pain. The patient reports that he developed an episode of chest pain around dinner time last evening after arriving home from work along with feeling short of breath, flushed, anxious, and diaphoretic. He notes that his chest pain originated near his xyphoid, radiated to his back, and subsided after about 1 hour after taking ibuprofen and walking his dog. He also endorses jaw pain radiating to his chin during his episode. He called the nurse line this morning, who recommended presentation to the ED. He does not endorse any specific trigger for his chest pain. The patient has a history of recurrent, similar chest pain episodes about once every 3 months. He also has a history of COVID about 15 days ago and bradycardia. He denies recent travel, drug/tobacco use, recent trauma, vomiting, or abdominal pain.    Review of Systems   Constitutional: Positive for diaphoresis.   Respiratory: Positive for shortness of breath.    Cardiovascular: Positive for chest pain.   Gastrointestinal: Negative for abdominal pain and vomiting.   Musculoskeletal: Positive for arthralgias (jaw).   Psychiatric/Behavioral: The patient is nervous/anxious.    All other systems reviewed and are negative.    Allergies:  No known drug allergies    Medications:  Fenofibrate    Past Medical History:     Hypertriglyceridemia  GI malignancy  Obesity  Hemorrhoids  Bradycardia  Colonic polyps    Past Surgical History:    Anal tag removal  Vasectomy  Colonoscopy     Family History:    Father: colorectal cancer  Mother: heart disease  Brother: hypertension, hyperlipidemia   Sister: hypertension obesity, hyperlipidemia    Social History:  The patient presents to the ED alone  Living Situation: lives in [place], [place]  Occupation: started new job at the end of  "June in [place], [place]   PCP: Chloe Mason MD     Physical Exam     Patient Vitals for the past 24 hrs:   BP Temp Temp src Pulse Resp SpO2 Height Weight   07/15/22 1100 -- -- -- (!) 49 28 100 % -- --   07/15/22 1045 117/76 -- -- (!) 47 16 100 % -- --   07/15/22 1030 (!) 126/92 -- -- 52 20 100 % -- --   07/15/22 1015 136/85 -- -- (!) 47 21 100 % -- --   07/15/22 1000 120/78 -- -- 60 22 98 % -- --   07/15/22 0945 (!) 131/91 -- -- (!) 47 (!) 38 100 % -- --   07/15/22 0930 137/83 -- -- (!) 49 22 99 % -- --   07/15/22 0919 -- -- -- -- -- -- 1.753 m (5' 9\") 90.7 kg (200 lb)   07/15/22 0915 127/83 -- -- 55 23 96 % -- --   07/15/22 0900 118/80 -- -- 54 17 100 % -- --   07/15/22 0839 (!) 154/92 (!) 96.6  F (35.9  C) Temporal 66 18 97 % -- --       Physical Exam    General: The patient is alert, in no respiratory distress.    HENT: masked.    Cardiovascular: Regular rate and rhythm. Good pulses in all four extremities. Normal capillary refill and skin turgor.     Respiratory: Lungs are clear. No nasal flaring. No retractions. No wheezing, no crackles.    Gastrointestinal: Abdomen soft. No guarding, no rebound. No palpable hernias.     Musculoskeletal: No gross deformity.     Skin: No rashes or petechiae.     Neurologic: The patient is alert and oriented x3. GCS 15. No testable cranial nerve deficit. Follows commands with clear and appropriate speech. Gives appropriate answers. Good strength in all extremities. No gross neurologic deficit. Gross sensation intact. Pupils are round and reactive. No meningismus.     Lymphatic: No cervical adenopathy. No lower extremity swelling.    Psychiatric: The patient is non-tearful.    Emergency Department Course   ECG  ECG results from 07/15/22   EKG 12 lead     Value    Systolic Blood Pressure     Diastolic Blood Pressure     Ventricular Rate 58    Atrial Rate 58    CO Interval 154    QRS Duration 90        QTc 388    P Axis 63    R AXIS -14    T Axis 15    Interpretation ECG     "  Sinus bradycardia with marked sinus arrhythmia  Minimal voltage criteria for LVH, may be normal variant  Borderline ECG  When compared with ECG of 27-MAY-2009 08:48,  No significant change was found         Imaging:  Chest XR,  PA & LAT   Final Result   IMPRESSION: Minimal atelectasis and/or infiltrate in the mid right   lung. Remaining lungs clear. No effusions. No pneumothorax. The   cardiac silhouette is not enlarged. Pulmonary vasculature is   unremarkable.      SONJA JUÁREZ MD            SYSTEM ID:  [id number]        Report per radiology    Laboratory:  Labs Ordered and Resulted from Time of ED Arrival to Time of ED Departure   COMPREHENSIVE METABOLIC PANEL - Abnormal       Result Value    Sodium 140      Potassium 4.0      Chloride 110 (*)     Carbon Dioxide (CO2) 28      Anion Gap 2 (*)     Urea Nitrogen 21      Creatinine 0.95      Calcium 9.1      Glucose 100 (*)     Alkaline Phosphatase 43      AST 21      ALT 32      Protein Total 7.2      Albumin 3.8      Bilirubin Total 0.9      GFR Estimate 90     TROPONIN I - Normal    Troponin I High Sensitivity 6     LIPASE - Normal    Lipase 181     D DIMER QUANTITATIVE - Normal    D-Dimer Quantitative 0.41     CBC WITH PLATELETS AND DIFFERENTIAL    WBC Count 4.5      RBC Count 5.61      Hemoglobin 15.9      Hematocrit 48.4      MCV 86      MCH 28.3      MCHC 32.9      RDW 12.3      Platelet Count 268      % Neutrophils 54      % Lymphocytes 32      % Monocytes 10      % Eosinophils 3      % Basophils 1      % Immature Granulocytes 0      NRBCs per 100 WBC 0      Absolute Neutrophils 2.4      Absolute Lymphocytes 1.4      Absolute Monocytes 0.4      Absolute Eosinophils 0.1      Absolute Basophils 0.0      Absolute Immature Granulocytes 0.0      Absolute NRBCs 0.0        Emergency Department Course:         Reviewed:  I reviewed nursing notes, vitals, past medical history and Care Everywhere    Assessments:  0850 I obtained history and examined the patient as noted  above.   1042 I rechecked the patient and explained that his D-dimer was normal.   1204 I rechecked the patient and explained that he may have COVID.    Interventions:  0916 Aspirin 324 mg PO    Disposition:  The patient was discharged to home.     Impression & Plan     Medical Decision Making:  The patient has had numerous episodes of chest pain very discrete rating to the back associated with diaphoresis and shortness of breath.  This is worrisome however has a low pretest probability for ACS.  Also with the recurrent episodes and it not being exertional I feel other causes are more likely.  The patient said this episode is more severe than previous having that because he recently had COVID with increased inflammation he shows no respiratory distress and was discharged home in good condition with negative D-dimer and negative troponin and further outpatient work-up symptoms to return for discussed.    Diagnosis:    ICD-10-CM    1. Other chest pain  R07.89    2. SOB (shortness of breath)  R06.02        Scribe Disclosure:  I, Marcelino Sanchez, am serving as a scribe at 8:47 AM on 7/15/2022 to document services personally performed by Brent Bingham MD based on my observations and the provider's statements to me.          Brent Bingham MD  07/15/22 [id number]

## 2022-07-15 NOTE — TELEPHONE ENCOUNTER
Patient had sudden, intense, very acute chest pain occur last night after got home from work last night. Lasted about 1 hour and then slowly went away on it's own.  Had a lot of discomfort in sternum and chest, very tight feeling in chest, felt like something pressing at base of sternum. Very intense ache in chest. Had pain radiate up in to both sides of jaw and around neck. Felt very flushed in face, sudden sweating. Was very uncomfortable for 1 hour. Felt lightheaded, anxious. Felt pain in mid back, felt like pulled muscle feeling in mid back.  Had been driving home from work, 50 minute drive. Got home and started to make some dinner and then suddenly pain came on. Took 2 tablets of Ibuprofen. Pain eventually subsided with time. Spoke with neighbor last night, neighbor told patient that happened to a friend of his, had a massive heart attack and told patient should get this checked out, hence call to clinic this morning.  History of obesity, hypertriglyceridemia.  Higher risk factor of cardiac related incident due to health history and age. Denies smoking.  Informed patient can't rule out MI or if cardiac related in clinic level, very basic testing (EKG, can't do stat labs).     Not having pain at this time of call. Has had this pain in chest previously but never lasted this long or this intense.     ED now today was recommended for further evaluation and work up. Patient will go to [facility] in [place].        Lucila Ontiveros RN  [facility]        Reason for Disposition    Pain also in shoulder(s) or arm(s) or jaw    Chest pain lasting longer than 5 minutes and occurred in last 3 days (72 hours) (Exception: feels exactly the same as previously diagnosed heartburn and has accompanying sour taste in mouth)    Chest pain or 'angina' comes and goes and is happening more often (increasing in frequency) or getting worse (increasing in severity) (Exception: chest pains that last only  "a few seconds)    Dizziness or lightheadedness    Additional Information    Negative: Severe difficulty breathing (e.g., struggling for each breath, speaks in single words)    Negative: Passed out (i.e., fainted, collapsed and was not responding)    Negative: Difficult to awaken or acting confused (e.g., disoriented, slurred speech)    Negative: Shock suspected (e.g., cold/pale/clammy skin, too weak to stand, low BP, rapid pulse)    Negative: Followed an injury to chest    Answer Assessment - Initial Assessment Questions  1. LOCATION: \"Where does it hurt?\"        Middle of chest, lower part of sternum , straight line to mid back  2. RADIATION: \"Does the pain go anywhere else?\" (e.g., into neck, jaw, arms, back)      Radiated up to jaw, both sides and mid back  3. ONSET: \"When did the chest pain begin?\" (Minutes, hours or days)       Started last night after work, lasted about an hour and gradually resolved with time  4. PATTERN \"Does the pain come and go, or has it been constant since it started?\"  \"Does it get worse with exertion?\"       Was a constant pain x 1 hour, tight feeling in chest and like something was pressing on sternum, Intense ache in chest. Felt like a pulled muscle feeing in back.   5. DURATION: \"How long does it last\" (e.g., seconds, minutes, hours)      Lasted about 1 hour and then stopped  6. SEVERITY: \"How bad is the pain?\"  (e.g., Scale 1-10; mild, moderate, or severe)     - MILD (1-3): doesn't interfere with normal activities      - MODERATE (4-7): interferes with normal activities or awakens from sleep     - SEVERE (8-10): excruciating pain, unable to do any normal activities        5/10, moderate  7. CARDIAC RISK FACTORS: \"Do you have any history of heart problems or risk factors for heart disease?\" (e.g., angina, prior heart attack; diabetes, high blood pressure, high cholesterol, smoker, or strong family history of heart disease)      Takes medication for high triglycerides, overweight  8. " "PULMONARY RISK FACTORS: \"Do you have any history of lung disease?\"  (e.g., blood clots in lung, asthma, emphysema, birth control pills)      No pulmonary risk factors  9. CAUSE: \"What do you think is causing the chest pain?\"      Has had similar chest pain in the last several years, but was not as intense as yesterday  10. OTHER SYMPTOMS: \"Do you have any other symptoms?\" (e.g., dizziness, nausea, vomiting, sweating, fever, difficulty breathing, cough)        \"Tiny bit of lightheadedness\", felt like was flushed in face, felt \"something funny\" tingling up neck, both sided jaw pain. Was feeling like couldn't take deep breath, sweating and flushed.  11. PREGNANCY: \"Is there any chance you are pregnant?\" \"When was your last menstrual period?\"        No, male pt    Protocols used: CHEST PAIN-A-OH      "

## 2022-07-15 NOTE — ED TRIAGE NOTES
Pt arrives with c/o episode of chest pain last night that lasted one hour. Pt reports some lower sternum pain that radiated into bilateral jaw and into his back. Pt also reports some SOB which improved but is still present. Pt reports he became very irritable at this time. Pt tested positive for COVID 15 days ago. Pt currently denies chest pain.     Triage Assessment     Row Name 07/15/22 0839       Triage Assessment (Adult)    Airway WDL WDL       Respiratory WDL    Respiratory WDL rhythm/pattern    Rhythm/Pattern, Respiratory shortness of breath       Skin Circulation/Temperature WDL    Skin Circulation/Temperature WDL WDL       Cardiac WDL    Cardiac WDL WDL       Peripheral/Neurovascular WDL    Peripheral Neurovascular WDL WDL       Cognitive/Neuro/Behavioral WDL    Cognitive/Neuro/Behavioral WDL WDL

## 2022-07-19 ENCOUNTER — TELEPHONE (OUTPATIENT)
Dept: FAMILY MEDICINE | Facility: CLINIC | Age: 62
End: 2022-07-19

## 2022-07-19 NOTE — TELEPHONE ENCOUNTER
Reason for Call:  Same Day Appointment, Requested Provider:  Fady Castro    PCP: Chloe Mason    Reason for visit: PT had visit to ED on Friday 07/15/22 for heart issues, tests came up neg but pt was advised to f/u w/ pcp w/in 5 days, can he be worked in sooner?     Duration of symptoms: N/A    Have you been treated for this in the past? No    Additional comments: N/A    Can we leave a detailed message on this number? YES    Phone number patient can be reached at: Cell number on file:    Telephone Information:   Mobile [phone]       Best Time: ANY    Call taken on 7/19/2022 at 12:55 PM by Angelina Padron

## 2022-07-20 ENCOUNTER — OFFICE VISIT (OUTPATIENT)
Dept: FAMILY MEDICINE | Facility: CLINIC | Age: 62
End: 2022-07-20
Payer: COMMERCIAL

## 2022-07-20 VITALS
SYSTOLIC BLOOD PRESSURE: 110 MMHG | HEIGHT: 69 IN | TEMPERATURE: 98.1 F | OXYGEN SATURATION: 99 % | BODY MASS INDEX: 30.51 KG/M2 | HEART RATE: 54 BPM | DIASTOLIC BLOOD PRESSURE: 62 MMHG | WEIGHT: 206 LBS

## 2022-07-20 DIAGNOSIS — R07.9 CHEST PAIN, UNSPECIFIED TYPE: Primary | ICD-10-CM

## 2022-07-20 PROCEDURE — 99213 OFFICE O/P EST LOW 20 MIN: CPT | Performed by: FAMILY MEDICINE

## 2022-07-20 NOTE — PROGRESS NOTES
"  Assessment & Plan     Chest pain, unspecified type: Initial work-up in the emergency department was unremarkable, however, his symptoms do still sound concerning for potential cardiac origin.  We will proceed with CT coronary calcium score to help stratify his risk as well as stress echocardiogram for further evaluation of chest pain.  We will follow-up on these results accordingly.  - Echocardiogram Exercise Stress; Future  - CT Coronary Calcium Scan; Future       BMI:   Estimated body mass index is 30.42 kg/m  as calculated from the following:    Height as of this encounter: 1.753 m (5' 9\").    Weight as of this encounter: 93.4 kg (206 lb).   Weight management plan: Discussed healthy diet and exercise guidelines        Return in about 1 month (around 8/20/2022) for follow up if symptoms not improving.    Fady Castro DO  [facility] [place] Vincent is a 62 year old, presenting for the following health issues:  ER F/U      HPI     ED/UC Followup:    Facility:  [facility] ED  Date of visit: 07/15/2022   Reason for visit: Chest Pain, SOB -- had symptoms on 07/14/2022  Current Status: Feels fine no problems since      Reid was seen in the ED 5 days ago after experiencing an episode of chest pain after arriving home from work.  The chest pain was accompanied by feeling short of breath, anxious, sweaty, and flushed.  The pain was central and radiated to his back but improved after about an hour after taking ibuprofen and walking his dog.    In the ED, an EKG was unremarkable as was a chest x-ray.  Troponin, D-dimer, lipase, metabolic panel, and blood counts were unremarkable.    He notes that he has had a mild dry cough that occurs in the evening.  He notices it more when he reclines backwards.  He denies any orthopnea or paroxysmal nocturnal dyspnea.  He typically walks 2 miles per day.  He has gained a little bit of weight and feels he sweats more and exercise is not " "as easy.  He recently started a new job that requires him to drive to [employer].  He states that chest pain started after he had just driven home with bad traffic      Review of Systems   Constitutional, HEENT, cardiovascular, pulmonary, gi and gu systems are negative, except as otherwise noted.      Objective    /62 (BP Location: Right arm, Patient Position: Chair, Cuff Size: Adult Large)   Pulse 54   Temp 98.1  F (36.7  C) (Tympanic)   Ht 1.753 m (5' 9\")   Wt 93.4 kg (206 lb)   SpO2 99%   BMI 30.42 kg/m    Body mass index is 30.42 kg/m .  Physical Exam   GENERAL: healthy, alert and no distress  RESP: lungs clear to auscultation - no rales, rhonchi or wheezes  CV: regular rates and rhythm, normal S1 S2, no S3 or S4, no murmur, click or rub and no peripheral edema  PSYCH: mentation appears normal, affect normal/bright                .  ..                                 The 10-year ASCVD risk score (Caldwell SHANITA Jr., et al., 2013) is: 7.2%    Values used to calculate the score:      Age: 62 years      Sex: Male      Is Non- : No      Diabetic: No      Tobacco smoker: No      Systolic Blood Pressure: 110 mmHg      Is BP treated: No      HDL Cholesterol: 41 mg/dL      Total Cholesterol: 154 mg/dL                                                "

## 2022-08-03 ENCOUNTER — HOSPITAL ENCOUNTER (OUTPATIENT)
Dept: CT IMAGING | Facility: CLINIC | Age: 62
Discharge: HOME | End: 2022-08-03
Attending: FAMILY MEDICINE | Admitting: FAMILY MEDICINE

## 2022-08-03 DIAGNOSIS — R07.9 CHEST PAIN, UNSPECIFIED TYPE: ICD-10-CM

## 2022-08-03 PROCEDURE — 75571 CT HRT W/O DYE W/CA TEST: CPT

## 2022-08-03 PROCEDURE — 75571 CT HRT W/O DYE W/CA TEST: CPT | Mod: 26 | Performed by: INTERNAL MEDICINE

## 2022-08-04 ENCOUNTER — HOSPITAL ENCOUNTER (OUTPATIENT)
Dept: CARDIOLOGY | Facility: CLINIC | Age: 62
Discharge: HOME | End: 2022-08-04
Attending: FAMILY MEDICINE | Admitting: FAMILY MEDICINE
Payer: COMMERCIAL

## 2022-08-04 DIAGNOSIS — R07.9 CHEST PAIN, UNSPECIFIED TYPE: ICD-10-CM

## 2022-08-04 PROCEDURE — C8928 TTE W OR W/O FOL W/CON,STRES: HCPCS

## 2022-08-04 PROCEDURE — 93016 CV STRESS TEST SUPVJ ONLY: CPT | Performed by: INTERNAL MEDICINE

## 2022-08-04 PROCEDURE — 93017 CV STRESS TEST TRACING ONLY: CPT

## 2022-08-04 PROCEDURE — 93018 CV STRESS TEST I&R ONLY: CPT | Performed by: INTERNAL MEDICINE

## 2022-08-04 PROCEDURE — 255N000002 HC RX 255 OP 636: Performed by: FAMILY MEDICINE

## 2022-08-04 PROCEDURE — 93321 DOPPLER ECHO F-UP/LMTD STD: CPT | Mod: 26 | Performed by: INTERNAL MEDICINE

## 2022-08-04 PROCEDURE — 93325 DOPPLER ECHO COLOR FLOW MAPG: CPT | Mod: 26 | Performed by: INTERNAL MEDICINE

## 2022-08-04 PROCEDURE — 93350 STRESS TTE ONLY: CPT | Mod: 26 | Performed by: INTERNAL MEDICINE

## 2022-08-04 RX ADMIN — HUMAN ALBUMIN MICROSPHERES AND PERFLUTREN 3 ML: 10; .22 INJECTION, SOLUTION INTRAVENOUS at 09:48

## 2022-10-03 ENCOUNTER — MYC MEDICAL ADVICE (OUTPATIENT)
Dept: FAMILY MEDICINE | Facility: CLINIC | Age: 62
End: 2022-10-03

## 2022-10-03 ASSESSMENT — ENCOUNTER SYMPTOMS
DIZZINESS: 0
HEMATURIA: 0
SORE THROAT: 0
HEARTBURN: 0
PARESTHESIAS: 0
ABDOMINAL PAIN: 0
MYALGIAS: 0
WEAKNESS: 0
COUGH: 0
HEADACHES: 0
NERVOUS/ANXIOUS: 0
FREQUENCY: 0
PALPITATIONS: 0
CONSTIPATION: 0
CHILLS: 0
DIARRHEA: 0
EYE PAIN: 0
FEVER: 0
SHORTNESS OF BREATH: 0
NAUSEA: 0
JOINT SWELLING: 0
DYSURIA: 0
HEMATOCHEZIA: 0
ARTHRALGIAS: 0

## 2022-10-04 NOTE — TELEPHONE ENCOUNTER
My chart message sent     Sridevi Amos RN, BSN  [facility]

## 2022-10-10 ENCOUNTER — OFFICE VISIT (OUTPATIENT)
Dept: FAMILY MEDICINE | Facility: CLINIC | Age: 62
End: 2022-10-10
Payer: COMMERCIAL

## 2022-10-10 VITALS
HEART RATE: 52 BPM | SYSTOLIC BLOOD PRESSURE: 118 MMHG | TEMPERATURE: 97.7 F | DIASTOLIC BLOOD PRESSURE: 72 MMHG | RESPIRATION RATE: 12 BRPM | OXYGEN SATURATION: 97 % | WEIGHT: 206 LBS | HEIGHT: 69 IN | BODY MASS INDEX: 30.51 KG/M2

## 2022-10-10 DIAGNOSIS — Z23 NEED FOR IMMUNIZATION AGAINST INFLUENZA: ICD-10-CM

## 2022-10-10 DIAGNOSIS — Z00.00 ROUTINE GENERAL MEDICAL EXAMINATION AT A HEALTH CARE FACILITY: Primary | ICD-10-CM

## 2022-10-10 DIAGNOSIS — Z13.1 SCREENING FOR DIABETES MELLITUS: ICD-10-CM

## 2022-10-10 DIAGNOSIS — Z23 HIGH PRIORITY FOR 2019-NCOV VACCINE: ICD-10-CM

## 2022-10-10 DIAGNOSIS — E78.1 HYPERTRIGLYCERIDEMIA: ICD-10-CM

## 2022-10-10 DIAGNOSIS — Z12.5 SCREENING FOR PROSTATE CANCER: ICD-10-CM

## 2022-10-10 LAB
ALBUMIN SERPL-MCNC: 4 G/DL (ref 3.4–5)
ALP SERPL-CCNC: 41 U/L (ref 40–150)
ALT SERPL W P-5'-P-CCNC: 33 U/L (ref 0–70)
ANION GAP SERPL CALCULATED.3IONS-SCNC: 5 MMOL/L (ref 3–14)
AST SERPL W P-5'-P-CCNC: 20 U/L (ref 0–45)
BILIRUB SERPL-MCNC: 0.6 MG/DL (ref 0.2–1.3)
BUN SERPL-MCNC: 22 MG/DL (ref 7–30)
CALCIUM SERPL-MCNC: 9.3 MG/DL (ref 8.5–10.1)
CHLORIDE BLD-SCNC: 106 MMOL/L (ref 94–109)
CHOLEST SERPL-MCNC: 156 MG/DL
CO2 SERPL-SCNC: 27 MMOL/L (ref 20–32)
CREAT SERPL-MCNC: 1.03 MG/DL (ref 0.66–1.25)
FASTING STATUS PATIENT QL REPORTED: YES
GFR SERPL CREATININE-BSD FRML MDRD: 82 ML/MIN/1.73M2
GLUCOSE BLD-MCNC: 97 MG/DL (ref 70–99)
HDLC SERPL-MCNC: 47 MG/DL
LDLC SERPL CALC-MCNC: 82 MG/DL
NONHDLC SERPL-MCNC: 109 MG/DL
POTASSIUM BLD-SCNC: 4.3 MMOL/L (ref 3.4–5.3)
PROT SERPL-MCNC: 7.3 G/DL (ref 6.8–8.8)
PSA SERPL-MCNC: 2.44 UG/L (ref 0–4)
SODIUM SERPL-SCNC: 138 MMOL/L (ref 133–144)
TRIGL SERPL-MCNC: 133 MG/DL

## 2022-10-10 PROCEDURE — 36415 COLL VENOUS BLD VENIPUNCTURE: CPT | Performed by: FAMILY MEDICINE

## 2022-10-10 PROCEDURE — 80061 LIPID PANEL: CPT | Performed by: FAMILY MEDICINE

## 2022-10-10 PROCEDURE — 90682 RIV4 VACC RECOMBINANT DNA IM: CPT | Performed by: FAMILY MEDICINE

## 2022-10-10 PROCEDURE — 91312 COVID-19,PF,PFIZER BOOSTER BIVALENT: CPT | Performed by: FAMILY MEDICINE

## 2022-10-10 PROCEDURE — 90471 IMMUNIZATION ADMIN: CPT | Performed by: FAMILY MEDICINE

## 2022-10-10 PROCEDURE — 99213 OFFICE O/P EST LOW 20 MIN: CPT | Mod: 25 | Performed by: FAMILY MEDICINE

## 2022-10-10 PROCEDURE — 99396 PREV VISIT EST AGE 40-64: CPT | Mod: 25 | Performed by: FAMILY MEDICINE

## 2022-10-10 PROCEDURE — 80053 COMPREHEN METABOLIC PANEL: CPT | Performed by: FAMILY MEDICINE

## 2022-10-10 PROCEDURE — 0124A COVID-19,PF,PFIZER BOOSTER BIVALENT: CPT | Performed by: FAMILY MEDICINE

## 2022-10-10 PROCEDURE — G0103 PSA SCREENING: HCPCS | Performed by: FAMILY MEDICINE

## 2022-10-10 RX ORDER — FENOFIBRATE 160 MG/1
160 TABLET ORAL DAILY
Qty: 90 TABLET | Refills: 3 | Status: SHIPPED | OUTPATIENT
Start: 2022-10-10 | End: 2023-10-02

## 2022-10-10 ASSESSMENT — ENCOUNTER SYMPTOMS
ARTHRALGIAS: 0
FEVER: 0
HEARTBURN: 0
PARESTHESIAS: 0
DYSURIA: 0
MYALGIAS: 0
JOINT SWELLING: 0
HEADACHES: 0
EYE PAIN: 0
SORE THROAT: 0
FREQUENCY: 0
ABDOMINAL PAIN: 0
DIZZINESS: 0
HEMATURIA: 0
NAUSEA: 0
HEMATOCHEZIA: 0
CONSTIPATION: 0
NERVOUS/ANXIOUS: 0
WEAKNESS: 0
COUGH: 0
CHILLS: 0
DIARRHEA: 0
PALPITATIONS: 0
SHORTNESS OF BREATH: 0

## 2022-10-10 ASSESSMENT — PAIN SCALES - GENERAL: PAINLEVEL: NO PAIN (0)

## 2022-10-10 NOTE — PROGRESS NOTES
SUBJECTIVE:   CC: Carlton is an 62 year old who presents for preventative health visit.     Patient has been advised of split billing requirements and indicates understanding: Yes       Healthy Habits:     Getting at least 3 servings of Calcium per day:  Yes    Bi-annual eye exam:  Yes    Dental care twice a year:  Yes    Sleep apnea or symptoms of sleep apnea:  None    Diet:  Regular (no restrictions)    Frequency of exercise:  6-7 days/week    Duration of exercise:  45-60 minutes    Taking medications regularly:  Yes    Medication side effects:  None    PHQ-2 Total Score: 0    Additional concerns today:  No      Hyperlipidemia Follow-Up      Are you regularly taking any medication or supplement to lower your cholesterol?   Yes- fenofibrate (TRIGLIDE/LOFIBRA) 160 MG tablet    Are you having muscle aches or other side effects that you think could be caused by your cholesterol lowering medication?  No      Today's PHQ-2 Score:   PHQ-2 (  [employer]) 10/3/2022   Q1: Little interest or pleasure in doing things 0   Q2: Feeling down, depressed or hopeless 0   PHQ-2 Score 0   PHQ-2 Total Score (12-17 Years)- Positive if 3 or more points; Administer PHQ-A if positive -   Q1: Little interest or pleasure in doing things Not at all   Q2: Feeling down, depressed or hopeless Not at all   PHQ-2 Score 0       Abuse: Current or Past(Physical, Sexual or Emotional)- NO  Do you feel safe in your environment? YES        Social History     Tobacco Use     Smoking status: Former     Packs/day: 0.50     Years: 5.00     Pack years: 2.50     Types: Cigarettes     Start date: 1980     Quit date: 1985     Years since quittin.7     Smokeless tobacco: Never     Tobacco comments:     intermittent smoker over 4 years, 40 YEARS AGO   Substance Use Topics     Alcohol use: Yes     Alcohol/week: 1.7 standard drinks     Comment: approx 1 to 3 drinks per week     If you drink alcohol do you typically have >3 drinks per day or >7 drinks per  week? No    Alcohol Use 10/10/2022   Prescreen: >3 drinks/day or >7 drinks/week? -   Prescreen: >3 drinks/day or >7 drinks/week? No       Last PSA:   [facility]ott PSA   Date Value Ref Range Status   03/27/2014 0.6 < OR = 4.0 ng/mL Final     Comment:        This test was performed using the Siemens  chemiluminescent method. Values obtained from  different assay methods cannot be used  interchangeably. PSA levels, regardless of  value, should not be interpreted as absolute  evidence of the presence or absence of disease.        PSA   Date Value Ref Range Status   09/09/2020 2.34 0 - 4 ug/L Final     Comment:     Assay Method:  Chemiluminescence using Siemens Vista analyzer     Prostate Specific Antigen Screen   Date Value Ref Range Status   09/14/2021 2.00 0.00 - 4.00 ug/L Final       Reviewed orders with patient. Reviewed health maintenance and updated orders accordingly - Yes  Lab work is in process    Reviewed and updated as needed this visit by clinical staff   Tobacco  Allergies  Meds   Med Hx  Surg Hx  Fam Hx  Soc Hx        Reviewed and updated as needed this visit by Provider                 Past Medical History:   Diagnosis Date     Family history of GI malignancy 6/29/2010    Father - colon cancer 61      History of colonic polyps 4/20/2017     Hypertriglyceridemia 5/14/2009      Past Surgical History:   Procedure Laterality Date     BIOPSY  2020    anal tag removed     SOFT TISSUE SURGERY  2010    remove torn miniscus     VASECTOMY  10/2008     [facility] COLONOSCOPY THRU STOMA, DIAGNOSTIC  2010    normal         Review of Systems   Constitutional: Negative for chills and fever.   HENT: Negative for congestion, ear pain, hearing loss and sore throat.    Eyes: Negative for pain and visual disturbance.   Respiratory: Negative for cough and shortness of breath.    Cardiovascular: Negative for chest pain, palpitations and peripheral edema.   Gastrointestinal: Negative for abdominal pain, constipation, diarrhea,  "heartburn, hematochezia and nausea.   Genitourinary: Negative for dysuria, frequency, genital sores, hematuria, impotence, penile discharge and urgency.   Musculoskeletal: Negative for arthralgias, joint swelling and myalgias.   Skin: Negative for rash.   Neurological: Negative for dizziness, weakness, headaches and paresthesias.   Psychiatric/Behavioral: Negative for mood changes. The patient is not nervous/anxious.      OBJECTIVE:   /72   Pulse 52   Temp 97.7  F (36.5  C) (Tympanic)   Resp 12   Ht 1.753 m (5' 9\")   Wt 93.4 kg (206 lb)   SpO2 97%   BMI 30.42 kg/m      Physical Exam  GENERAL: healthy, alert and no distress  EYES: Eyes grossly normal to inspection  HENT: ear canals and TM's normal, nose and mouth without ulcers or lesions  NECK: no adenopathy and no asymmetry, masses, or scars  RESP: lungs clear to auscultation - no rales, rhonchi or wheezes  CV: regular rates and rhythm, normal S1 S2, no S3 or S4 and no murmur, click or rub  ABDOMEN: soft, nontender, no hepatosplenomegaly, no masses and bowel sounds normal  MS: no gross musculoskeletal defects noted, no edema  SKIN: no suspicious lesions or rashes; multiple skin tags on back - not irritated  PSYCH: mentation appears normal, affect normal/bright    Diagnostic Test Results:  Labs reviewed in Epic      ASSESSMENT/PLAN:   1. Routine general medical examination at a health care facility  Health maintenance reviewed and updated.   - Hepatitis B Surface Antibody; Future    2. Hypertriglyceridemia  Medication working well without adverse effects.  - Lipid panel reflex to direct LDL Non-fasting; Future  - fenofibrate (TRIGLIDE/LOFIBRA) 160 MG tablet; Take 1 tablet (160 mg) by mouth daily  Dispense: 90 tablet; Refill: 3    3. Screening for prostate cancer  - PSA, screen; Future    4. Screening for diabetes mellitus  - Comprehensive metabolic panel (BMP + Alb, Alk Phos, ALT, AST, Total. Bili, TP); Future    5. Need for immunization against " "influenza  - INFLUENZA QUAD, RECOMBINANT, P-FREE (RIV4) (FLUBLOK) AGE 50-64 [[id number]]    6. High priority for 2019-nCoV vaccine  - COVID-19,PF,PFIZER BOOSTER BIVALENT 12+Yrs      Patient has been advised of split billing requirements and indicates understanding: Yes    COUNSELING:   Reviewed preventive health counseling, as reflected in patient instructions    Estimated body mass index is 30.42 kg/m  as calculated from the following:    Height as of this encounter: 1.753 m (5' 9\").    Weight as of this encounter: 93.4 kg (206 lb).     Weight management plan: Discussed healthy diet and exercise guidelines    He reports that he quit smoking about 37 years ago. His smoking use included cigarettes. He started smoking about 42 years ago. He has a 2.50 pack-year smoking history. He has never used smokeless tobacco.      Counseling Resources:  ATP IV Guidelines  Pooled Cohorts Equation Calculator  FRAX Risk Assessment  ICSI Preventive Guidelines  Dietary Guidelines for Americans, 2010  USDA's MyPlate  ASA Prophylaxis  Lung CA Screening    Fady Castro [facility] [place]  "

## 2023-09-07 ENCOUNTER — MYC MEDICAL ADVICE (OUTPATIENT)
Dept: FAMILY MEDICINE | Facility: CLINIC | Age: 63
End: 2023-09-07
Payer: COMMERCIAL

## 2023-10-02 ENCOUNTER — MYC MEDICAL ADVICE (OUTPATIENT)
Dept: FAMILY MEDICINE | Facility: CLINIC | Age: 63
End: 2023-10-02
Payer: COMMERCIAL

## 2023-10-02 DIAGNOSIS — E78.1 HYPERTRIGLYCERIDEMIA: ICD-10-CM

## 2023-10-04 RX ORDER — FENOFIBRATE 160 MG/1
160 TABLET ORAL DAILY
Qty: 90 TABLET | Refills: 0 | Status: SHIPPED | OUTPATIENT
Start: 2023-10-04 | End: 2023-10-31

## 2023-10-04 NOTE — TELEPHONE ENCOUNTER
Prescription approved per [facility] Refill Protocol.  Tea Kenny RN

## 2023-10-24 ASSESSMENT — ENCOUNTER SYMPTOMS
CHILLS: 0
EYE PAIN: 0
WEAKNESS: 0
HEARTBURN: 0
FEVER: 0
MYALGIAS: 0
HEADACHES: 0
DYSURIA: 0
DIZZINESS: 0
JOINT SWELLING: 0
HEMATURIA: 0
PALPITATIONS: 0
NAUSEA: 0
CONSTIPATION: 0
SORE THROAT: 0
PARESTHESIAS: 0
ARTHRALGIAS: 0
ABDOMINAL PAIN: 0
NERVOUS/ANXIOUS: 0
FREQUENCY: 0
COUGH: 0
SHORTNESS OF BREATH: 0
DIARRHEA: 0
HEMATOCHEZIA: 0

## 2023-10-31 ENCOUNTER — OFFICE VISIT (OUTPATIENT)
Dept: FAMILY MEDICINE | Facility: CLINIC | Age: 63
End: 2023-10-31
Payer: COMMERCIAL

## 2023-10-31 VITALS
HEART RATE: 44 BPM | SYSTOLIC BLOOD PRESSURE: 92 MMHG | DIASTOLIC BLOOD PRESSURE: 56 MMHG | TEMPERATURE: 97.2 F | OXYGEN SATURATION: 96 % | BODY MASS INDEX: 27.99 KG/M2 | RESPIRATION RATE: 12 BRPM | WEIGHT: 189 LBS | HEIGHT: 69 IN

## 2023-10-31 DIAGNOSIS — Z13.1 SCREENING FOR DIABETES MELLITUS: ICD-10-CM

## 2023-10-31 DIAGNOSIS — Z12.5 SCREENING FOR PROSTATE CANCER: ICD-10-CM

## 2023-10-31 DIAGNOSIS — E78.1 HYPERTRIGLYCERIDEMIA: ICD-10-CM

## 2023-10-31 DIAGNOSIS — Z00.00 ROUTINE GENERAL MEDICAL EXAMINATION AT A HEALTH CARE FACILITY: Primary | ICD-10-CM

## 2023-10-31 DIAGNOSIS — Z23 NEED FOR IMMUNIZATION AGAINST INFLUENZA: ICD-10-CM

## 2023-10-31 DIAGNOSIS — Z23 NEED FOR COVID-19 VACCINE: ICD-10-CM

## 2023-10-31 DIAGNOSIS — Z29.11 NEED FOR RSV IMMUNIZATION: ICD-10-CM

## 2023-10-31 LAB
ALBUMIN SERPL BCG-MCNC: 4.3 G/DL (ref 3.5–5.2)
ALP SERPL-CCNC: ABNORMAL U/L
ALT SERPL W P-5'-P-CCNC: ABNORMAL U/L
ANION GAP SERPL CALCULATED.3IONS-SCNC: 10 MMOL/L (ref 7–15)
AST SERPL W P-5'-P-CCNC: ABNORMAL U/L
BILIRUB SERPL-MCNC: 0.4 MG/DL
BUN SERPL-MCNC: 23 MG/DL (ref 8–23)
CALCIUM SERPL-MCNC: 9.7 MG/DL (ref 8.8–10.2)
CHLORIDE SERPL-SCNC: 103 MMOL/L (ref 98–107)
CHOLEST SERPL-MCNC: 132 MG/DL
CREAT SERPL-MCNC: 1.04 MG/DL (ref 0.67–1.17)
DEPRECATED HCO3 PLAS-SCNC: 27 MMOL/L (ref 22–29)
EGFRCR SERPLBLD CKD-EPI 2021: 81 ML/MIN/1.73M2
GLUCOSE SERPL-MCNC: 90 MG/DL (ref 70–99)
HDLC SERPL-MCNC: 40 MG/DL
LDLC SERPL CALC-MCNC: 74 MG/DL
NONHDLC SERPL-MCNC: 92 MG/DL
POTASSIUM SERPL-SCNC: 6 MMOL/L (ref 3.4–5.3)
PROT SERPL-MCNC: 7.5 G/DL (ref 6.4–8.3)
PSA SERPL DL<=0.01 NG/ML-MCNC: 10 NG/ML (ref 0–4.5)
SODIUM SERPL-SCNC: 140 MMOL/L (ref 135–145)
TRIGL SERPL-MCNC: 88 MG/DL

## 2023-10-31 PROCEDURE — 80061 LIPID PANEL: CPT | Performed by: FAMILY MEDICINE

## 2023-10-31 PROCEDURE — 80048 BASIC METABOLIC PNL TOTAL CA: CPT | Performed by: FAMILY MEDICINE

## 2023-10-31 PROCEDURE — 82247 BILIRUBIN TOTAL: CPT | Performed by: FAMILY MEDICINE

## 2023-10-31 PROCEDURE — 36415 COLL VENOUS BLD VENIPUNCTURE: CPT | Performed by: FAMILY MEDICINE

## 2023-10-31 PROCEDURE — 90480 ADMN SARSCOV2 VAC 1/ONLY CMP: CPT | Performed by: FAMILY MEDICINE

## 2023-10-31 PROCEDURE — 90471 IMMUNIZATION ADMIN: CPT | Performed by: FAMILY MEDICINE

## 2023-10-31 PROCEDURE — 91320 SARSCV2 VAC 30MCG TRS-SUC IM: CPT | Performed by: FAMILY MEDICINE

## 2023-10-31 PROCEDURE — 90682 RIV4 VACC RECOMBINANT DNA IM: CPT | Performed by: FAMILY MEDICINE

## 2023-10-31 PROCEDURE — G0103 PSA SCREENING: HCPCS | Performed by: FAMILY MEDICINE

## 2023-10-31 PROCEDURE — 99213 OFFICE O/P EST LOW 20 MIN: CPT | Mod: 25 | Performed by: FAMILY MEDICINE

## 2023-10-31 PROCEDURE — 99396 PREV VISIT EST AGE 40-64: CPT | Mod: 25 | Performed by: FAMILY MEDICINE

## 2023-10-31 PROCEDURE — 82040 ASSAY OF SERUM ALBUMIN: CPT | Performed by: FAMILY MEDICINE

## 2023-10-31 PROCEDURE — 84155 ASSAY OF PROTEIN SERUM: CPT | Performed by: FAMILY MEDICINE

## 2023-10-31 RX ORDER — FENOFIBRATE 160 MG/1
160 TABLET ORAL DAILY
Qty: 90 TABLET | Refills: 3 | Status: SHIPPED | OUTPATIENT
Start: 2023-10-31 | End: 2024-05-21 | Stop reason: SINTOL

## 2023-10-31 ASSESSMENT — ENCOUNTER SYMPTOMS
PALPITATIONS: 0
CHILLS: 0
ARTHRALGIAS: 0
MYALGIAS: 0
DYSURIA: 0
JOINT SWELLING: 0
HEARTBURN: 0
HEMATURIA: 0
HEADACHES: 0
FEVER: 0
DIZZINESS: 0
NERVOUS/ANXIOUS: 0
FREQUENCY: 0
DIARRHEA: 0
PARESTHESIAS: 0
COUGH: 0
CONSTIPATION: 0
HEMATOCHEZIA: 0
NAUSEA: 0
SORE THROAT: 0
WEAKNESS: 0
EYE PAIN: 0
ABDOMINAL PAIN: 0
SHORTNESS OF BREATH: 0

## 2023-10-31 NOTE — PROGRESS NOTES
SUBJECTIVE:   CC: Carlton is an 63 year old who presents for preventative health visit.         10/31/2023    10:17 AM   Additional Questions   Roomed by Kurt LACEY CMA       Healthy Habits:     Getting at least 3 servings of Calcium per day:  Yes    Bi-annual eye exam:  Yes    Dental care twice a year:  Yes    Sleep apnea or symptoms of sleep apnea:  None    Diet:  Regular (no restrictions)    Frequency of exercise:  6-7 days/week    Duration of exercise:  45-60 minutes    Taking medications regularly:  Yes    Medication side effects:  Not applicable    Today's PHQ-2 Score:       10/31/2023    10:09 AM   PHQ-2 (  [employer])   Q1: Little interest or pleasure in doing things 1   Q2: Feeling down, depressed or hopeless 1   PHQ-2 Score 2   Q1: Little interest or pleasure in doing things Several days   Q2: Feeling down, depressed or hopeless Several days   PHQ-2 Score 2     Hyperlipidemia Follow-Up    Are you regularly taking any medication or supplement to lower your cholesterol?   Yes- fenofibrate (TRIGLIDE/LOFIBRA) 160 MG tablet  Are you having muscle aches or other side effects that you think could be caused by your cholesterol lowering medication?  No    Hemoglobin A1C (%)   Date Value   2018 5.1     LDL Cholesterol Calculated (mg/dL)   Date Value   10/10/2022 82   2021 93   2020 103 (H)   2019 114 (H)       Social History     Tobacco Use    Smoking status: Former     Packs/day: 0.50     Years: 5.00     Additional pack years: 0.00     Total pack years: 2.50     Types: Cigarettes     Start date: 1980     Quit date: 1985     Years since quittin.8    Smokeless tobacco: Never    Tobacco comments:     intermittent smoker over 4 years, 40 YEARS AGO   Substance Use Topics    Alcohol use: Yes     Alcohol/week: 1.7 standard drinks of alcohol     Comment: approx 1 to 3 drinks per week             10/24/2023    10:33 AM   Alcohol Use   Prescreen: >3 drinks/day or >7 drinks/week? No          3/30/2018     8:28 AM   AUDIT - Alcohol Use Disorders Identification Test - Reproduced from the World Health Organization Audit 2001 (Second Edition)   1.  How often do you have a drink containing alcohol? 2 to 3 times a week     Last PSA:   [facility] PSA   Date Value Ref Range Status   03/27/2014 0.6 < OR = 4.0 ng/mL Final     Comment:        This test was performed using the Siemens  chemiluminescent method. Values obtained from  different assay methods cannot be used  interchangeably. PSA levels, regardless of  value, should not be interpreted as absolute  evidence of the presence or absence of disease.        PSA   Date Value Ref Range Status   09/09/2020 2.34 0 - 4 ug/L Final     Comment:     Assay Method:  Chemiluminescence using Siemens Vista analyzer     Prostate Specific Antigen Screen   Date Value Ref Range Status   10/10/2022 2.44 0.00 - 4.00 ug/L Final     Reviewed orders with patient. Reviewed health maintenance and updated orders accordingly - Yes  Lab work is in process    Reviewed and updated as needed this visit by clinical staff   Tobacco  Allergies  Meds            Reviewed and updated as needed this visit by Provider                 Past Medical History:   Diagnosis Date    Family history of GI malignancy 6/29/2010    Father - colon cancer 61     History of colonic polyps 4/20/2017    Hypertriglyceridemia 5/14/2009      Past Surgical History:   Procedure Laterality Date    BIOPSY  2020    anal tag removed    SOFT TISSUE SURGERY  2010    remove torn miniscus    VASECTOMY  10/2008    [facility] COLONOSCOPY THRU STOMA, DIAGNOSTIC  2010    normal       Review of Systems   Constitutional:  Negative for chills and fever.   HENT:  Negative for congestion, ear pain, hearing loss and sore throat.    Eyes:  Negative for pain and visual disturbance.   Respiratory:  Negative for cough and shortness of breath.    Cardiovascular:  Negative for chest pain, palpitations and peripheral edema.   Gastrointestinal:   "Negative for abdominal pain, constipation, diarrhea, heartburn, hematochezia and nausea.   Genitourinary:  Negative for dysuria, frequency, genital sores, hematuria, impotence, penile discharge and urgency.   Musculoskeletal:  Negative for arthralgias, joint swelling and myalgias.   Skin:  Negative for rash.   Neurological:  Negative for dizziness, weakness, headaches and paresthesias.   Psychiatric/Behavioral:  Negative for mood changes. The patient is not nervous/anxious.        OBJECTIVE:   BP 92/56 (BP Location: Right arm, Patient Position: Sitting, Cuff Size: Adult Large)   Pulse (!) 44   Temp 97.2  F (36.2  C) (Tympanic)   Resp 12   Ht 1.74 m (5' 8.5\")   Wt 85.7 kg (189 lb)   SpO2 96%   BMI 28.32 kg/m      Physical Exam  GENERAL: healthy, alert and no distress  EYES: Eyes grossly normal to inspection  HENT: ear canals and TM's normal, nose and mouth without ulcers or lesions  NECK: no adenopathy and no asymmetry, masses, or scars  RESP: lungs clear to auscultation - no rales, rhonchi or wheezes  CV: regular rates and rhythm, normal S1 S2, no S3 or S4, and no murmur, click or rub  ABDOMEN: soft, nontender, without hepatosplenomegaly or masses  MS: no gross musculoskeletal defects noted, no edema  SKIN: no suspicious lesions or rashes  PSYCH: mentation appears normal, affect normal/bright    Diagnostic Test Results:  Labs reviewed in Epic    ASSESSMENT/PLAN:   1. Routine general medical examination at a health care facility  Health maintenance reviewed and updated. Emphasized importance of balanced diet and regular exercise.    2. Screening for diabetes mellitus  - COMPREHENSIVE METABOLIC PANEL; Future    3. Hypertriglyceridemia  Stable, recheck lipids. Continue fenofibrate  - Lipid panel reflex to direct LDL Non-fasting; Future  - fenofibrate (TRIGLIDE/LOFIBRA) 160 MG tablet; Take 1 tablet (160 mg) by mouth daily  Dispense: 90 tablet; Refill: 3    4. Screening for prostate cancer  - PROSTATE SPEC ANTIGEN " SCREEN; Future    5. Need for immunization against influenza  - INFLUENZA VACCINE 18-64Y (FLUBLOK)    6. Need for COVID-19 vaccine  - COVID-19 12+ (2023-24) (PFIZER)    7. Need for RSV immunization  Future order placed - separate from flu and covid by 2 weeks  - RSV VACCINE (ABRYSVO); Future        Patient has been advised of split billing requirements and indicates understanding: Yes      COUNSELING:   Reviewed preventive health counseling, as reflected in patient instructions      He reports that he quit smoking about 38 years ago. His smoking use included cigarettes. He started smoking about 43 years ago. He has a 2.50 pack-year smoking history. He has never used smokeless tobacco.          Fady Castro,   [facility] [place]

## 2023-11-13 DIAGNOSIS — R97.20 ELEVATED PROSTATE SPECIFIC ANTIGEN (PSA): ICD-10-CM

## 2023-11-13 DIAGNOSIS — E87.5 HYPERKALEMIA: Primary | ICD-10-CM

## 2023-11-13 NOTE — TELEPHONE ENCOUNTER
MEDICAL RECORDS REQUEST   [facility]  Urology Clinic  [address] [place] [address]  PHONE: [phone]  Fax: [phone]        FUTURE VISIT INFORMATION                                                   Reid Monaco, : 1960 scheduled for future visit at [facility]    APPOINTMENT INFORMATION:  Date: 2023  Provider:  Xavier Parra MD  Reason for Visit/Diagnosis: Elevated prostate specific antigen (PSA)    REFERRAL INFORMATION:  Referring provider:  Fady Castro DO in [facility]      RECORDS REQUESTED FOR VISIT                                                     NOTES  STATUS/DETAILS   OFFICE NOTE from referring provider  yes, 10/31/2023, 10/10/2022, 2021 -- Fady Castro DO in  [facility]    MEDICATION LIST  yes   LABS     PSA  yes     PRE-VISIT CHECKLIST      Joint diagnostic appointment coordinated correctly          (ensure right order & amount of time) Yes   RECORD COLLECTION COMPLETE Yes

## 2023-11-16 ENCOUNTER — LAB (OUTPATIENT)
Dept: LAB | Facility: CLINIC | Age: 63
End: 2023-11-16
Payer: COMMERCIAL

## 2023-11-16 ENCOUNTER — OFFICE VISIT (OUTPATIENT)
Dept: ONCOLOGY | Facility: CLINIC | Age: 63
End: 2023-11-16
Attending: UROLOGY
Payer: COMMERCIAL

## 2023-11-16 ENCOUNTER — PRE VISIT (OUTPATIENT)
Dept: ONCOLOGY | Facility: CLINIC | Age: 63
End: 2023-11-16

## 2023-11-16 VITALS
SYSTOLIC BLOOD PRESSURE: 114 MMHG | BODY MASS INDEX: 27.85 KG/M2 | HEART RATE: 49 BPM | DIASTOLIC BLOOD PRESSURE: 69 MMHG | TEMPERATURE: 97 F | RESPIRATION RATE: 16 BRPM | OXYGEN SATURATION: 98 % | WEIGHT: 188 LBS | HEIGHT: 69 IN

## 2023-11-16 DIAGNOSIS — R97.20 ELEVATED PROSTATE SPECIFIC ANTIGEN (PSA): ICD-10-CM

## 2023-11-16 DIAGNOSIS — E87.5 HYPERKALEMIA: ICD-10-CM

## 2023-11-16 DIAGNOSIS — R39.9 LOWER URINARY TRACT SYMPTOMS (LUTS): Primary | ICD-10-CM

## 2023-11-16 PROCEDURE — 99204 OFFICE O/P NEW MOD 45 MIN: CPT | Performed by: UROLOGY

## 2023-11-16 PROCEDURE — 36415 COLL VENOUS BLD VENIPUNCTURE: CPT

## 2023-11-16 PROCEDURE — 99214 OFFICE O/P EST MOD 30 MIN: CPT | Performed by: UROLOGY

## 2023-11-16 PROCEDURE — 80048 BASIC METABOLIC PNL TOTAL CA: CPT

## 2023-11-16 RX ORDER — TAMSULOSIN HYDROCHLORIDE 0.4 MG/1
0.4 CAPSULE ORAL DAILY
Qty: 30 CAPSULE | Refills: 11 | Status: SHIPPED | OUTPATIENT
Start: 2023-11-16 | End: 2024-05-28

## 2023-11-16 ASSESSMENT — PAIN SCALES - GENERAL: PAINLEVEL: NO PAIN (0)

## 2023-11-16 NOTE — LETTER
2023         RE: Reid Monaco  [address] [place] [address]        Dear Colleague,    Thank you for referring your patient, Reid Monaco, to the [facility]. Please see a copy of my visit note below.          Chief Complaint:   Elevated PSA   LUTS          Consult or Referral:     Mr. Reid Monaco is a 63 year old male seen in consultation from Dr. Castro.         History of Present Illness:    Reid Monaco is a very pleasant 63 year old male who presents with elevated PSA. He reports lower urinary symptoms including urgency and nocturia ( AUASI 24). He denies  family history of prostate cancer          Past Medical History:     Past Medical History:   Diagnosis Date     Family history of GI malignancy 2010    Father - colon cancer 61      History of colonic polyps 2017     Hypertriglyceridemia 2009            Past Surgical History:     Past Surgical History:   Procedure Laterality Date     BIOPSY      anal tag removed     SOFT TISSUE SURGERY      remove torn miniscus     VASECTOMY  10/2008     [facility] COLONOSCOPY THRU STOMA, DIAGNOSTIC      normal            Medications     Current Outpatient Medications   Medication     fenofibrate (TRIGLIDE/LOFIBRA) 160 MG tablet     No current facility-administered medications for this visit.            Family History:     Family History   Problem Relation Age of Onset     Heart Disease Mother         tachycardia     Cancer - colorectal Father          age 61     Colon Cancer Father          age 61 of colon cancer     Hypertension Sister      Obesity Sister      Hypertension Brother      Hyperlipidemia Sister      Hyperlipidemia Brother      Diabetes Other         mother's brother     Hypertension Sister      Hyperlipidemia Sister      Hypertension Brother      Hyperlipidemia Brother      C.A.D. No family hx of      Diabetes No family hx of             Social History:     Social  History     Socioeconomic History     Marital status:      Spouse name: Not on file     Number of children: 3     Years of education: Not on file     Highest education level: Not on file   Occupational History     Occupation: consultant     Employer: [employer]   Tobacco Use     Smoking status: Former     Packs/day: 0.50     Years: 5.00     Additional pack years: 0.00     Total pack years: 2.50     Types: Cigarettes     Start date: 1980     Quit date: 1985     Years since quittin.8     Smokeless tobacco: Never     Tobacco comments:     intermittent smoker over 4 years, 40 YEARS AGO   Vaping Use     Vaping Use: Never used   Substance and Sexual Activity     Alcohol use: Yes     Alcohol/week: 1.7 standard drinks of alcohol     Comment: approx 1 to 3 drinks per week     Drug use: No     Sexual activity: Yes     Partners: Female     Birth control/protection: Male Surgical     Comment: vasectomy   Other Topics Concern      Service No     Blood Transfusions No     Caffeine Concern No     Occupational Exposure No     Hobby Hazards Not Asked     Sleep Concern No     Stress Concern Not Asked     Weight Concern Not Asked     Special Diet Not Asked     Back Care Not Asked     Exercise Yes     Comment: using ellipital 3 times per week     Bike Helmet Not Asked     Seat Belt Yes     Self-Exams Not Asked     Parent/sibling w/ CABG, MI or angioplasty before 65F 55M? No   Social History Narrative     Not on file     Social Determinants of Health     Financial Resource Strain: Low Risk  (10/24/2023)    Financial Resource Strain      Within the past 12 months, have you or your family members you live with been unable to get utilities (heat, electricity) when it was really needed?: No   Food Insecurity: Low Risk  (10/24/2023)    Food Insecurity      Within the past 12 months, did you worry that your food would run out before you got money to buy more?: No      Within the past 12 months, did the food you bought  "just not last and you didn t have money to get more?: No   Transportation Needs: Low Risk  (10/24/2023)    Transportation Needs      Within the past 12 months, has lack of transportation kept you from medical appointments, getting your medicines, non-medical meetings or appointments, work, or from getting things that you need?: No   Physical Activity: Not on file   Stress: Not on file   Social Connections: Not on file   Interpersonal Safety: Low Risk  (10/31/2023)    Interpersonal Safety      Do you feel physically and emotionally safe where you currently live?: Yes      Within the past 12 months, have you been hit, slapped, kicked or otherwise physically hurt by someone?: No      Within the past 12 months, have you been humiliated or emotionally abused in other ways by your partner or ex-partner?: No   Housing Stability: Low Risk  (10/24/2023)    Housing Stability      Do you have housing? : Yes      Are you worried about losing your housing?: No            Allergies:   Patient has no known allergies.         Review of Systems     10 point ROS of systems including Constitutional, Eyes, Respiratory, Cardiovascular, Gastroenterology, Genitourinary, Integumentary, Muscularskeletal, Psychiatric were all negative except for pertinent positives noted in my HPI.          Physical Exam:     Patient is a 63 year old  male Body mass index is 27.76 kg/m .,  Vitals: Blood pressure 114/69, pulse (!) 49, temperature 97  F (36.1  C), temperature source Tympanic, resp. rate 16, height 1.753 m (5' 9\"), weight 85.3 kg (188 lb), SpO2 98%.  General Appearance Adult: Alert, no acute distress, oriented  HENT: throat/mouth:normal, good dentition  Neck: No adenopathy,masses or thyromegaly  Lungs: no respiratory distress, or pursed lip breathing  Heart: No obvious jugular venous distension present  Abdomen: soft, nontender, no organomegaly or masses  Lymphatics: No cervical or supraclavicular adenopathy  Musculoskeltal: extremities normal, " no peripheral edema  Skin: no suspicious lesions or rashes  Neuro: Alert, oriented, speech and mentation normal  Psych: affect and mood normal  Gait: Normal  : deferred      Labs and Pathology:    I reviewed all applicable laboratory and pathology data and went over findings with patient  Significant for marked rise in PSA value     Lab Results   Component Value Date/Time    PSA 10.00 (H) 10/31/2023 11:11 AM    PSA 2.44 10/10/2022 09:40 AM    PSA 2.00 09/14/2021 08:43 AM    PSA 2.34 09/09/2020 08:54 AM    PSA 2.35 04/03/2019 08:56 AM    PSA 1.38 03/30/2018 09:00 AM    PSA 0.6 03/27/2014 08:19 AM               Imaging:    No relevant imaging to review today            Assessment and Plan:     Assessment:  63-year-old male with elevated PSA and LUTS    We had a long discussion about the utility of PSA screening.  We talked about the Pros and Cons.  We discussed the findings of the PLCO and EORTC studies.  We discussed the results of the [place] trial of treatment vs. observation in clinically detected prostate cancer as well as the results of the PIVOT trial in the context of screen-detected cancer.  We discussed the recommendations by the AUA, and USPSTF. We also discussed the significant (2-6%) and rising risk of biopsy associated sepsis.      We also discussed the emerging role of MRI in the diagnosis of prostate cancer and the potential for performing MRI-Ultrasound Fusion biopsy if suspicious lesions are noted.  I told Carlton that we should repeat the PSA if his prostate MRI is unremarkable for any suspicious lesion and if PSA remains in the same neighborhood, we should consider a regular prostate biopsy.  He is agreeable.    We also discussed Flomax as a treatment option for his LUTS.  The side effect profile of the medication was carefully reviewed.    Plan:  Schedule for prostate MRI   Start Flomax  Call with the results to discuss the next steps    45 total minutes spent on the date of the encounter  including direct interaction with the patient, performing chart review, documentation and further activities as noted above.        Xavier Parra MD   Department of Urology   [facility]       Again, thank you for allowing me to participate in the care of your patient.        Sincerely,        Xavier Parra MD

## 2023-11-16 NOTE — NURSING NOTE
"Oncology Rooming Note    November 16, 2023 8:06 AM   Reid Monaco is a 63 year old male who presents for:    Chief Complaint   Patient presents with    Oncology Clinic Visit     Elevated prostate specific antigen     Initial Vitals: /69 (Cuff Size: Adult Regular)   Pulse (!) 49   Temp 97  F (36.1  C) (Tympanic)   Resp 16   Ht 1.753 m (5' 9\")   Wt 85.3 kg (188 lb)   SpO2 98%   BMI 27.76 kg/m   Estimated body mass index is 27.76 kg/m  as calculated from the following:    Height as of this encounter: 1.753 m (5' 9\").    Weight as of this encounter: 85.3 kg (188 lb). Body surface area is 2.04 meters squared.  No Pain (0) Comment: Data Unavailable   No LMP for male patient.  Allergies reviewed: Yes  Medications reviewed: Yes    Medications: Medication refills not needed today.  Pharmacy name entered into [employer]:    [id number] - [place], [place] - [address] [facility] [address] [facility] [place] - [place] [address]    Clinical concerns: new patient       Jdaa Scott, KARIME              "

## 2023-11-16 NOTE — PROGRESS NOTES
Chief Complaint:   Elevated PSA   LUTS          Consult or Referral:     Mr. Reid Monaco is a 63 year old male seen in consultation from Dr. Castro.         History of Present Illness:    Reid Monaco is a very pleasant 63 year old male who presents with elevated PSA. He reports lower urinary symptoms including urgency and nocturia ( AUASI 24). He denies  family history of prostate cancer          Past Medical History:     Past Medical History:   Diagnosis Date    Family history of GI malignancy 2010    Father - colon cancer 61     History of colonic polyps 2017    Hypertriglyceridemia 2009            Past Surgical History:     Past Surgical History:   Procedure Laterality Date    BIOPSY      anal tag removed    SOFT TISSUE SURGERY      remove torn miniscus    VASECTOMY  10/2008    [facility] COLONOSCOPY THRU STOMA, DIAGNOSTIC      normal            Medications     Current Outpatient Medications   Medication    fenofibrate (TRIGLIDE/LOFIBRA) 160 MG tablet     No current facility-administered medications for this visit.            Family History:     Family History   Problem Relation Age of Onset    Heart Disease Mother         tachycardia    Cancer - colorectal Father          age 61    Colon Cancer Father          age 61 of colon cancer    Hypertension Sister     Obesity Sister     Hypertension Brother     Hyperlipidemia Sister     Hyperlipidemia Brother     Diabetes Other         mother's brother    Hypertension Sister     Hyperlipidemia Sister     Hypertension Brother     Hyperlipidemia Brother     C.A.D. No family hx of     Diabetes No family hx of             Social History:     Social History     Socioeconomic History    Marital status:      Spouse name: Not on file    Number of children: 3    Years of education: Not on file    Highest education level: Not on file   Occupational History    Occupation: consultant     Employer: [employer]   Tobacco Use    Smoking  status: Former     Packs/day: 0.50     Years: 5.00     Additional pack years: 0.00     Total pack years: 2.50     Types: Cigarettes     Start date: 1980     Quit date: 1985     Years since quittin.8    Smokeless tobacco: Never    Tobacco comments:     intermittent smoker over 4 years, 40 YEARS AGO   Vaping Use    Vaping Use: Never used   Substance and Sexual Activity    Alcohol use: Yes     Alcohol/week: 1.7 standard drinks of alcohol     Comment: approx 1 to 3 drinks per week    Drug use: No    Sexual activity: Yes     Partners: Female     Birth control/protection: Male Surgical     Comment: vasectomy   Other Topics Concern     Service No    Blood Transfusions No    Caffeine Concern No    Occupational Exposure No    Hobby Hazards Not Asked    Sleep Concern No    Stress Concern Not Asked    Weight Concern Not Asked    Special Diet Not Asked    Back Care Not Asked    Exercise Yes     Comment: using ellipital 3 times per week    Bike Helmet Not Asked    Seat Belt Yes    Self-Exams Not Asked    Parent/sibling w/ CABG, MI or angioplasty before 65F 55M? No   Social History Narrative    Not on file     Social Determinants of Health     Financial Resource Strain: Low Risk  (10/24/2023)    Financial Resource Strain     Within the past 12 months, have you or your family members you live with been unable to get utilities (heat, electricity) when it was really needed?: No   Food Insecurity: Low Risk  (10/24/2023)    Food Insecurity     Within the past 12 months, did you worry that your food would run out before you got money to buy more?: No     Within the past 12 months, did the food you bought just not last and you didn t have money to get more?: No   Transportation Needs: Low Risk  (10/24/2023)    Transportation Needs     Within the past 12 months, has lack of transportation kept you from medical appointments, getting your medicines, non-medical meetings or appointments, work, or from getting things  "that you need?: No   Physical Activity: Not on file   Stress: Not on file   Social Connections: Not on file   Interpersonal Safety: Low Risk  (10/31/2023)    Interpersonal Safety     Do you feel physically and emotionally safe where you currently live?: Yes     Within the past 12 months, have you been hit, slapped, kicked or otherwise physically hurt by someone?: No     Within the past 12 months, have you been humiliated or emotionally abused in other ways by your partner or ex-partner?: No   Housing Stability: Low Risk  (10/24/2023)    Housing Stability     Do you have housing? : Yes     Are you worried about losing your housing?: No            Allergies:   Patient has no known allergies.         Review of Systems     10 point ROS of systems including Constitutional, Eyes, Respiratory, Cardiovascular, Gastroenterology, Genitourinary, Integumentary, Muscularskeletal, Psychiatric were all negative except for pertinent positives noted in my HPI.          Physical Exam:     Patient is a 63 year old  male Body mass index is 27.76 kg/m .,  Vitals: Blood pressure 114/69, pulse (!) 49, temperature 97  F (36.1  C), temperature source Tympanic, resp. rate 16, height 1.753 m (5' 9\"), weight 85.3 kg (188 lb), SpO2 98%.  General Appearance Adult: Alert, no acute distress, oriented  HENT: throat/mouth:normal, good dentition  Neck: No adenopathy,masses or thyromegaly  Lungs: no respiratory distress, or pursed lip breathing  Heart: No obvious jugular venous distension present  Abdomen: soft, nontender, no organomegaly or masses  Lymphatics: No cervical or supraclavicular adenopathy  Musculoskeltal: extremities normal, no peripheral edema  Skin: no suspicious lesions or rashes  Neuro: Alert, oriented, speech and mentation normal  Psych: affect and mood normal  Gait: Normal  : deferred      Labs and Pathology:    I reviewed all applicable laboratory and pathology data and went over findings with patient  Significant for marked " rise in PSA value     Lab Results   Component Value Date/Time    PSA 10.00 (H) 10/31/2023 11:11 AM    PSA 2.44 10/10/2022 09:40 AM    PSA 2.00 09/14/2021 08:43 AM    PSA 2.34 09/09/2020 08:54 AM    PSA 2.35 04/03/2019 08:56 AM    PSA 1.38 03/30/2018 09:00 AM    PSA 0.6 03/27/2014 08:19 AM               Imaging:    No relevant imaging to review today            Assessment and Plan:     Assessment:  63-year-old male with elevated PSA and LUTS    We had a long discussion about the utility of PSA screening.  We talked about the Pros and Cons.  We discussed the findings of the PLCO and EORTC studies.  We discussed the results of the [place] trial of treatment vs. observation in clinically detected prostate cancer as well as the results of the PIVOT trial in the context of screen-detected cancer.  We discussed the recommendations by the AUA, and USPSTF. We also discussed the significant (2-6%) and rising risk of biopsy associated sepsis.      We also discussed the emerging role of MRI in the diagnosis of prostate cancer and the potential for performing MRI-Ultrasound Fusion biopsy if suspicious lesions are noted.  I told Carlton that we should repeat the PSA if his prostate MRI is unremarkable for any suspicious lesion and if PSA remains in the same neighborhood, we should consider a regular prostate biopsy.  He is agreeable.    We also discussed Flomax as a treatment option for his LUTS.  The side effect profile of the medication was carefully reviewed.    Plan:  Schedule for prostate MRI   Start Flomax  Call with the results to discuss the next steps    45 total minutes spent on the date of the encounter including direct interaction with the patient, performing chart review, documentation and further activities as noted above.        Xavier Parra MD   Department of Urology   [facility]

## 2023-11-17 LAB
ANION GAP SERPL CALCULATED.3IONS-SCNC: 9 MMOL/L (ref 7–15)
BUN SERPL-MCNC: 22.8 MG/DL (ref 8–23)
CALCIUM SERPL-MCNC: 9.5 MG/DL (ref 8.8–10.2)
CHLORIDE SERPL-SCNC: 103 MMOL/L (ref 98–107)
CREAT SERPL-MCNC: 1.3 MG/DL (ref 0.67–1.17)
DEPRECATED HCO3 PLAS-SCNC: 28 MMOL/L (ref 22–29)
EGFRCR SERPLBLD CKD-EPI 2021: 62 ML/MIN/1.73M2
GLUCOSE SERPL-MCNC: 91 MG/DL (ref 70–99)
POTASSIUM SERPL-SCNC: 4.5 MMOL/L (ref 3.4–5.3)
SODIUM SERPL-SCNC: 140 MMOL/L (ref 135–145)

## 2023-11-18 ENCOUNTER — MYC MEDICAL ADVICE (OUTPATIENT)
Dept: FAMILY MEDICINE | Facility: CLINIC | Age: 63
End: 2023-11-18
Payer: COMMERCIAL

## 2023-11-20 ENCOUNTER — PATIENT OUTREACH (OUTPATIENT)
Dept: GASTROENTEROLOGY | Facility: CLINIC | Age: 63
End: 2023-11-20
Payer: COMMERCIAL

## 2023-12-10 ENCOUNTER — ANCILLARY PROCEDURE (OUTPATIENT)
Dept: MRI IMAGING | Facility: CLINIC | Age: 63
End: 2023-12-10
Attending: UROLOGY
Payer: COMMERCIAL

## 2023-12-10 DIAGNOSIS — R97.20 ELEVATED PROSTATE SPECIFIC ANTIGEN (PSA): ICD-10-CM

## 2023-12-10 PROCEDURE — 72197 MRI PELVIS W/O & W/DYE: CPT | Performed by: STUDENT IN AN ORGANIZED HEALTH CARE EDUCATION/TRAINING PROGRAM

## 2023-12-10 PROCEDURE — A9585 GADOBUTROL INJECTION: HCPCS | Mod: JZ | Performed by: STUDENT IN AN ORGANIZED HEALTH CARE EDUCATION/TRAINING PROGRAM

## 2023-12-10 RX ORDER — GADOBUTROL 604.72 MG/ML
10 INJECTION INTRAVENOUS ONCE
Status: COMPLETED | OUTPATIENT
Start: 2023-12-10 | End: 2023-12-10

## 2023-12-10 RX ADMIN — GADOBUTROL 8.5 ML: 604.72 INJECTION INTRAVENOUS at 13:33

## 2023-12-11 ENCOUNTER — PATIENT OUTREACH (OUTPATIENT)
Dept: UROLOGY | Facility: CLINIC | Age: 63
End: 2023-12-11
Payer: COMMERCIAL

## 2023-12-11 NOTE — TELEPHONE ENCOUNTER
Writer reached out to pt to inform him of the need to proceed with a prostate bx based on his MR prostate results.     Pt expressed frustration that [place] [facility] [place]. Writer provided emotional support.     Pt has been scheduled for next available bx.     Will continue to follow as needed.

## 2024-01-02 DIAGNOSIS — Z79.2 PROPHYLACTIC ANTIBIOTIC: Primary | ICD-10-CM

## 2024-01-02 RX ORDER — CIPROFLOXACIN 500 MG/1
TABLET, FILM COATED ORAL
Qty: 6 TABLET | Refills: 0 | Status: SHIPPED | OUTPATIENT
Start: 2024-01-02 | End: 2024-05-28

## 2024-01-12 ENCOUNTER — OFFICE VISIT (OUTPATIENT)
Dept: UROLOGY | Facility: CLINIC | Age: 64
End: 2024-01-12
Payer: COMMERCIAL

## 2024-01-12 VITALS
DIASTOLIC BLOOD PRESSURE: 69 MMHG | HEART RATE: 67 BPM | OXYGEN SATURATION: 97 % | SYSTOLIC BLOOD PRESSURE: 135 MMHG | BODY MASS INDEX: 27.47 KG/M2 | WEIGHT: 186 LBS

## 2024-01-12 DIAGNOSIS — R97.20 ELEVATED PROSTATE SPECIFIC ANTIGEN (PSA): Primary | ICD-10-CM

## 2024-01-12 PROCEDURE — 88344 IMHCHEM/IMCYTCHM EA MLT ANTB: CPT | Performed by: PATHOLOGY

## 2024-01-12 PROCEDURE — 76872 US TRANSRECTAL: CPT | Performed by: UROLOGY

## 2024-01-12 PROCEDURE — G0416 PROSTATE BIOPSY, ANY MTHD: HCPCS | Performed by: PATHOLOGY

## 2024-01-12 PROCEDURE — 76999 ECHO EXAMINATION PROCEDURE: CPT | Performed by: UROLOGY

## 2024-01-12 PROCEDURE — 55700 PR BIOPSY OF PROSTATE,NEEDLE/PUNCH: CPT | Performed by: UROLOGY

## 2024-01-12 RX ORDER — LIDOCAINE HYDROCHLORIDE 10 MG/ML
10 INJECTION, SOLUTION INFILTRATION; PERINEURAL ONCE
Status: COMPLETED | OUTPATIENT
Start: 2024-01-12 | End: 2024-01-12

## 2024-01-12 RX ORDER — GENTAMICIN 40 MG/ML
80 INJECTION, SOLUTION INTRAMUSCULAR; INTRAVENOUS ONCE
Status: COMPLETED | OUTPATIENT
Start: 2024-01-12 | End: 2024-01-12

## 2024-01-12 RX ADMIN — GENTAMICIN 80 MG: 40 INJECTION, SOLUTION INTRAMUSCULAR; INTRAVENOUS at 13:10

## 2024-01-12 RX ADMIN — LIDOCAINE HYDROCHLORIDE 10 ML: 10 INJECTION, SOLUTION INFILTRATION; PERINEURAL at 13:35

## 2024-01-12 ASSESSMENT — PAIN SCALES - GENERAL
PAINLEVEL: NO PAIN (0)
PAINLEVEL: NO PAIN (0)

## 2024-01-12 NOTE — NURSING NOTE
Chief Complaint   Patient presents with    Elevated PSA       Procedure was explained to patient prior to performing said procedure. The patient signed the consent form and all questions were answered prior to the procedure. Any pre-procedural antibiotics were given according to the performing physicians recommendation. Pt's information was confirmed on samples and samples were sent for analysis. Paient reviewed information on labels sent with patient and confirmed the accuracy of all the labels.    Consent read and signed: Yes   No Known Allergies  Performing Physician: Dr. stokes  Antibiotic taken?  yes  Aspirin or other blood thinning medications discontinued 7-10 days:  yes  Time of Fleet's enema:  1145 am    12 samples were taken from the right and left, medial and lateral base, mid, and apex of the prostate respectively. 2 additional samples were taken from target areas. Vitals were repeated prior to patient leaving and instructions for post TRUS care were explained to the pt.

## 2024-01-12 NOTE — NURSING NOTE
The following medication was given by MD :     MEDICATION:  Lidocaine 1% Soln  ROUTE: Local Infiltration   SITE: Prostate  DOSE: 100mg/10ml  LOT #: [id number]  : [employer]  EXPIRATION DATE: 05/2026  NDC#: 23912-460-97  Was there drug waste? Yes  Amount of drug waste (mL): 20.  Reason for waste:  As per MD  Multi-dose vial: Yes       The following medication was given by this nurse:     MEDICATION: Gentamicin   ROUTE: IM  SITE: LUQ - Gluteus  DOSE:80mg/2ml  LOT #: [id number]  : [employer]  EXPIRATION DATE: 05/2026  NDC#:[address]-84   Was there drug waste? No  Multi-dose vial: Yes       Using a 1 1/2 inch 21 guage needle medication drawn up as ordered with sterile syringe. Using sterile technique, a new 1 1/2 needle 21 gauge placed on syringe and patient cleansed with alcohol pad. Site was mapped out using palm of hand on the greater trochanter and forefinger on iliac crest. Using V technique between middle finger and index finger. Skin was tracted and Injection was given using a 90 degree angle dart method and after aspiration of needle and no blood, medication was slowly given via IM injection.  Patient tolerated injection well, and bandage placed on site following removal.      Keshia Jaimes LPN

## 2024-01-12 NOTE — PATIENT INSTRUCTIONS
[facility]  Transrectal Ultrasound  Post Operative Information    The physician who performed your Transrectal Ultrasound is Dr. Parra (telephone number [phone], 8-5 Monday thru Friday, [phone] after hours).  Please contact this doctor if you have any problems or questions.  If unable to reach your doctor, please return to the Emergency Department.    Take one antibiotic the evening of the procedure and then as directed on your prescription.  Drink at least 6-8 glasses of fluids for the first 48 hours.  Avoid heavy lifting and strenuous activity for 48 hours.  Avoid sexual intercourse for the first 24 hours.  No aspirin or ibuprofen products (Motrin, Advil, Nuprin, ect.) for one week.  You may take acetaminophen (Tylenol) for pain.  You may notice a small amount of blood on the tissue after a bowel movement.  You may pass blood with clots in your urine following the procedure.  The amount will decrease with time but may be visible for up to two weeks.   You make have blood in your semen for 4 weeks after the procedure.  You may experience mild perineal (groin area) discomfort after the procedure.  Please call you doctor if you have any of the follow symptoms:  Fever  Increase in the amount of blood passed  Severe discomfort or pain

## 2024-01-12 NOTE — PROGRESS NOTES
Chief Complaint   Patient presents with    Elevated PSA       Procedure was explained to patient prior to performing said procedure. The patient signed the consent form and all questions were answered prior to the procedure. Any pre-procedural antibiotics were given according to the performing physicians recommendation. Pt's information was confirmed on samples and samples were sent for analysis. Paient reviewed information on labels sent with patient and confirmed the accuracy of all the labels.    Consent read and signed: Yes   No Known Allergies  Performing Physician: Dr. cortes  Antibiotic taken?  yes  Aspirin or other blood thinning medications discontinued 7-10 days:  yes  Time of Fleet's enema:  1145      12 samples were taken from the right and left, medial and lateral base, mid, and apex of the prostate respectively.  additional samples were taken from . Vitals were repeated prior to patient leaving and instructions for post TRUS care were explained to the pt.       PREPROCEDURE DIAGNOSIS: Elevated PSA   POSTPROCEDURE DIAGNOSIS: Elevated PSA  PROCEDURE: Transrectal ultrasound sizing and transrectal ultrasound guided prostatic needle biopsy, including MRI fusion targeting  for Reid Monaco who is a 63 year old male.   SURGEON: Xavier Cortes MD   ANESTHESIA: 5 mL of 1% periprostatic block bilaterally   We previously obtained an MRI of the prostate and identified all PIRADS 3-5 lesions for targeting    DESCRIPTION OF PROCEDURE: The procedure, the outcome, the anesthesia, and the risks were discussed with the patient. Informed consent was obtained and signed and a timeout was completed prior to the procedure. Digital rectal examination was performed with the below findings noted. Anesthesia was administered as noted above and the transrectal ultrasound probe was inserted, sizing was performed, and the below findings were noted. 2 core biopsies were taken from each of the MRI targets, and 12 core biopsies  were taken as described below. The probe was then withdrawn. Patient tolerated the procedure well.   FINDINGS: Digital rectal exam reveals normal prostate. US images were obtained and then fused with the MRI images.  The fused images were then used to guide the biopsy of the targeted lesions with 2 cores taken of each lesion.  We then performed random biopsies.  12 cores were taken with 6 on each side, base, mid and apex.      Xavier Parra MD   Department of Urology   [facility]

## 2024-01-12 NOTE — LETTER
1/12/2024       RE: Reid Monaco  [address] [place] [address]     Dear Colleague,    Thank you for referring your patient, Reid Monaco, to the [facility] [place] at [facility]. Please see a copy of my visit note below.    Chief Complaint   Patient presents with    Elevated PSA       Procedure was explained to patient prior to performing said procedure. The patient signed the consent form and all questions were answered prior to the procedure. Any pre-procedural antibiotics were given according to the performing physicians recommendation. Pt's information was confirmed on samples and samples were sent for analysis. [facility] reviewed information on labels sent with patient and confirmed the accuracy of all the labels.    Consent read and signed: Yes   No Known Allergies  Performing Physician: Dr. cortes  Antibiotic taken?  yes  Aspirin or other blood thinning medications discontinued 7-10 days:  yes  Time of Fleet's enema:  1145      12 samples were taken from the right and left, medial and lateral base, mid, and apex of the prostate respectively.  additional samples were taken from . Vitals were repeated prior to patient leaving and instructions for post TRUS care were explained to the pt.       PREPROCEDURE DIAGNOSIS: Elevated PSA   POSTPROCEDURE DIAGNOSIS: Elevated PSA  PROCEDURE: Transrectal ultrasound sizing and transrectal ultrasound guided prostatic needle biopsy, including MRI fusion targeting  for Reid Monaco who is a 63 year old male.   SURGEON: Xavier Cortes MD   ANESTHESIA: 5 mL of 1% periprostatic block bilaterally   We previously obtained an MRI of the prostate and identified all PIRADS 3-5 lesions for targeting    DESCRIPTION OF PROCEDURE: The procedure, the outcome, the anesthesia, and the risks were discussed with the patient. Informed consent was obtained and signed and a timeout was completed prior to the  procedure. Digital rectal examination was performed with the below findings noted. Anesthesia was administered as noted above and the transrectal ultrasound probe was inserted, sizing was performed, and the below findings were noted. 2 core biopsies were taken from each of the MRI targets, and 12 core biopsies were taken as described below. The probe was then withdrawn. Patient tolerated the procedure well.   FINDINGS: Digital rectal exam reveals normal prostate. US images were obtained and then fused with the MRI images.  The fused images were then used to guide the biopsy of the targeted lesions with 2 cores taken of each lesion.  We then performed random biopsies.  12 cores were taken with 6 on each side, base, mid and apex.      Xavier Parra MD   Department of Urology   [facility]

## 2024-01-17 LAB
PATH REPORT.COMMENTS IMP SPEC: ABNORMAL
PATH REPORT.COMMENTS IMP SPEC: ABNORMAL
PATH REPORT.COMMENTS IMP SPEC: YES
PATH REPORT.FINAL DX SPEC: ABNORMAL
PATH REPORT.GROSS SPEC: ABNORMAL
PATH REPORT.MICROSCOPIC SPEC OTHER STN: ABNORMAL
PATH REPORT.MICROSCOPIC SPEC OTHER STN: ABNORMAL
PATH REPORT.RELEVANT HX SPEC: ABNORMAL
PHOTO IMAGE: ABNORMAL

## 2024-01-19 ENCOUNTER — VIRTUAL VISIT (OUTPATIENT)
Dept: ONCOLOGY | Facility: CLINIC | Age: 64
End: 2024-01-19
Attending: UROLOGY
Payer: COMMERCIAL

## 2024-01-19 VITALS — HEIGHT: 69 IN | BODY MASS INDEX: 27.25 KG/M2 | WEIGHT: 184 LBS

## 2024-01-19 DIAGNOSIS — R97.20 ELEVATED PROSTATE SPECIFIC ANTIGEN (PSA): Primary | ICD-10-CM

## 2024-01-19 PROCEDURE — 99214 OFFICE O/P EST MOD 30 MIN: CPT | Mod: 95 | Performed by: UROLOGY

## 2024-01-19 ASSESSMENT — PAIN SCALES - GENERAL: PAINLEVEL: NO PAIN (0)

## 2024-01-19 NOTE — LETTER
"    1/19/2024         RE: Reid Monaco  [address] [facility] [address]        Dear Colleague,    Thank you for referring your patient, Reid Monaco, to the [facility]. Please see a copy of my visit note below.    Virtual Visit Details    Type of service:  Video Visit     Originating Location (pt. Location): Home    Distant Location (provider location):  On-site  Platform used for Video Visit: [facility]      Urology Clinic       HPI  Reid Monaco is a 63 year old male with newly diagnosed prostate cancer, here for follow-up after his prostate biopsy.      The patient denies any major issues after the biopsy     No changes to health, hospitalizations or new diagnoses in the interim    PHYSICAL EXAM  Vitals:  No vitals were obtained today due to virtual visit.    Physical Exam   GENERAL: Healthy, alert and no distress  EYES: Eyes grossly normal to inspection.  No discharge or erythema, or obvious scleral/conjunctival abnormalities.  RESP: No audible wheeze, cough, or visible cyanosis.  No visible retractions or increased work of breathing.    SKIN: Visible skin clear. No significant rash, abnormal pigmentation or lesions.  NEURO: Cranial nerves grossly intact.  Mentation and speech appropriate for age.  PSYCH: Mentation appears normal, affect normal/bright, judgement and insight intact, normal speech and appearance well-groomed.      Labs  Lab Results   Component Value Date    WBC 4.5 07/15/2022    WBC 6.7 02/13/2021     Lab Results   Component Value Date    RBC 5.61 07/15/2022    RBC 5.53 02/13/2021     Lab Results   Component Value Date    HGB 15.9 07/15/2022    HGB 16.0 02/13/2021     Lab Results   Component Value Date    HCT 48.4 07/15/2022    HCT 47.7 02/13/2021     No components found for: \"MCT\"  Lab Results   Component Value Date    MCV 86 07/15/2022    MCV 86 02/13/2021     Lab Results   Component Value Date    MCH 28.3 07/15/2022    MCH 28.9 02/13/2021     Lab " Results   Component Value Date    MCHC 32.9 07/15/2022    MCHC 33.5 02/13/2021     Lab Results   Component Value Date    RDW 12.3 07/15/2022    RDW 12.1 02/13/2021     Lab Results   Component Value Date     07/15/2022     02/13/2021        Last Comprehensive Metabolic Panel:  Sodium   Date Value Ref Range Status   11/16/2023 140 135 - 145 mmol/L Final     Comment:     Reference intervals for this test were updated on 09/26/2023 to more accurately reflect our healthy population. There may be differences in the flagging of prior results with similar values performed with this method. Interpretation of those prior results can be made in the context of the updated reference intervals.    02/13/2021 140 133 - 144 mmol/L Final     Potassium   Date Value Ref Range Status   11/16/2023 4.5 3.4 - 5.3 mmol/L Final   10/10/2022 4.3 3.4 - 5.3 mmol/L Final   02/13/2021 3.4 3.4 - 5.3 mmol/L Final     Chloride   Date Value Ref Range Status   11/16/2023 103 98 - 107 mmol/L Final   10/10/2022 106 94 - 109 mmol/L Final   02/13/2021 104 94 - 109 mmol/L Final     Carbon Dioxide   Date Value Ref Range Status   02/13/2021 30 20 - 32 mmol/L Final     Carbon Dioxide (CO2)   Date Value Ref Range Status   11/16/2023 28 22 - 29 mmol/L Final   10/10/2022 27 20 - 32 mmol/L Final     Anion Gap   Date Value Ref Range Status   11/16/2023 9 7 - 15 mmol/L Final   10/10/2022 5 3 - 14 mmol/L Final   02/13/2021 6 3 - 14 mmol/L Final     Glucose   Date Value Ref Range Status   11/16/2023 91 70 - 99 mg/dL Final   10/10/2022 97 70 - 99 mg/dL Final   02/13/2021 112 (H) 70 - 99 mg/dL Final     Urea Nitrogen   Date Value Ref Range Status   11/16/2023 22.8 8.0 - 23.0 mg/dL Final   10/10/2022 22 7 - 30 mg/dL Final   02/13/2021 18 7 - 30 mg/dL Final     Creatinine   Date Value Ref Range Status   11/16/2023 1.30 (H) 0.67 - 1.17 mg/dL Final   02/13/2021 1.00 0.66 - 1.25 mg/dL Final     GFR Estimate   Date Value Ref Range Status   11/16/2023 62 >60  mL/min/1.73m2 Final   02/13/2021 81 >60 mL/min/[1.73_m2] Final     Comment:     Non  GFR Calc  Starting 12/18/2018, serum creatinine based estimated GFR (eGFR) will be   calculated using the Chronic Kidney Disease Epidemiology Collaboration   (CKD-EPI) equation.       Calcium   Date Value Ref Range Status   11/16/2023 9.5 8.8 - 10.2 mg/dL Final   02/13/2021 9.7 8.5 - 10.1 mg/dL Final     Bilirubin Total   Date Value Ref Range Status   10/31/2023 0.4 <=1.2 mg/dL Final   09/09/2020 0.7 0.2 - 1.3 mg/dL Final     Alkaline Phosphatase   Date Value Ref Range Status   10/31/2023   Final     Comment:     Unsatisfactory specimen - hemolyzed     09/09/2020 45 40 - 150 U/L Final     ALT   Date Value Ref Range Status   10/31/2023   Final     Comment:     Unsatisfactory specimen - hemolyzed    Reference intervals for this test were updated on 6/12/2023 to more accurately reflect our healthy population. There may be differences in the flagging of prior results with similar values performed with this method. Interpretation of those prior results can be made in the context of the updated reference intervals.     09/09/2020 37 0 - 70 U/L Final     AST   Date Value Ref Range Status   10/31/2023   Final     Comment:     Unsatisfactory specimen - hemolyzed    Reference intervals for this test were updated on 6/12/2023 to more accurately reflect our healthy population. There may be differences in the flagging of prior results with similar values performed with this method. Interpretation of those prior results can be made in the context of the updated reference intervals.   09/09/2020 25 0 - 45 U/L Final       Abbott PSA   Date Value Ref Range Status   03/27/2014 0.6 < OR = 4.0 ng/mL Final     Comment:        This test was performed using the Siemens  chemiluminescent method. Values obtained from  different assay methods cannot be used  interchangeably. PSA levels, regardless of  value, should not be interpreted as  absolute  evidence of the presence or absence of disease.        PSA   Date Value Ref Range Status   09/09/2020 2.34 0 - 4 ug/L Final     Comment:     Assay Method:  Chemiluminescence using Siemens Vista analyzer   04/03/2019 2.35 0 - 4 ug/L Final     Comment:     Assay Method:  Chemiluminescence using Siemens Vista analyzer   03/30/2018 1.38 0 - 4 ug/L Final     Comment:     Assay Method:  Chemiluminescence using Siemens Vista analyzer     Prostate Specific Antigen Screen   Date Value Ref Range Status   10/31/2023 10.00 (H) 0.00 - 4.50 ng/mL Final   10/10/2022 2.44 0.00 - 4.00 ug/L Final   09/14/2021 2.00 0.00 - 4.00 ug/L Final        Surgical pathology  Surgical Pathology Report                         Case: [id number]                                   Authorizing Provider:  Xavier Parra MD          Collected:           01/12/2024 02:00 PM           Ordering Location:     [facility]  Received:            01/12/2024 02:00 PM                                  [facility]                                                                 Pathologist:           Ijeoma Del Real MD                                                           Specimens:   A) - Prostate, Left Base X2                                                                          B) - Prostate, Left Mid X2                                                                          C) - Prostate, Left [place] X2                                                                          D) - Prostate, Right Base X2                                                                        E) - Prostate, Right Mid X2                                                                          F) - Prostate, Right [place] X2                                                                        G) - Prostate, Target Lesion x2                                                            Final Diagnosis   A.  Prostate gland, left  base, needle biopsies X2:  -  Negative for malignancy.     B.  Prostate gland, left  mid, needle biopsies X2:  - Prostatic acinar adenocarcinoma.      - Nicolas score:  6 (3+3)     - Proportion of tissue involved by tumor:  <5%     - Number of tissue cores involved by tumor:  1 (out of 2)     - Angiolymphatic invasion:  Not identified     - Perineural invasion:  Not identified     C.  Prostate gland, left  apex, needle biopsies X2:  - Negative for malignancy.     D.  Prostate gland, right base, needle biopsies X2:  - Negative for malignancy.  - Focal mild chronic inflammation.     E.  Prostate gland, right mid, needle biopsies X2:  - Negative for malignancy.  - A small fragment of normal colonic mucosa.     F.  Prostate gland, right apex, needle biopsies X2:  - Negative for malignancy.  - Focal mild chronic inflammation.     G.  Prostate gland, target lesion, needle biopsies X2:  - Negative for malignancy.         Electronically signed by Ijeoma Del Real MD on 1/17/2024 at 11:19 AM     Imaging   MR prostate 12/10/23     FINDINGS:  Size: 5.1 x 3.6 x 4.0 cm, 38 grams  Hemorrhage: Absent  Peripheral zone: Heterogeneous on T2-weighted images. Suspicious  lesions as detailed below.  Transition zone: Enlarged with BPH changes. Transition zone nodules  which are circumscribed or mostly encapsulated without diffusion  restriction.  PI-RADS 2.  No highly suspicious nodules.     Lesion(s) in rank order of severity (highest score- to lowest score,  then by size)      Lesion 1:  Location: Right mid gland peripheral zone at the 7 o'clock position  relative to the urethra. Series 7 image 53.  Size: 10 mm  T2 description: Ill-defined hypodense  T2 numerical assessment: 3  DWI description: Mild diffusion restriction  DWI numerical assessment: 3  DCE assessment: Positive    Prostate margin: Capsular abutment 6-15 mm  Lesion overall PI-RADS category: 4     Neurovascular bundles: No neurovascular bundle involvement by  malignancy.  Seminal vesicles: No seminal  vesicle involvement by malignancy.  Lymph nodes: No lymph node involvement  Bones: No suspicious lesions  Other pelvic organs: No additional findings.     ASSESSMENT AND PLAN  63 year old male with low risk prostate cancer    We had an extensive discussion about the significance of localized, prostate cancer. We discussed the options for treatment of a localized prostate cancer including active surveillance, brachytherapy, external beam radiation therapy, and surgical removal.       The majority of our discussion related to active surveillance for prostate cancer. We discussed that this is a common treatment decision for men with very low and low risk prostate cancer and is endorsed by major urologic guidelines. We discussed the overall slow growth of most prostate cancers and the long history of data suggesting the safety of this approach. We discussed the ProtecT trial and that this randomized study demonstrated no differences in survival for men comparing active surveillance to radiation or surgery. We discussed that surveillance typically involves PSA rechecks every 6 months with intermittent repeat MRIs and repeat biopsies. We also discussed that approximately half of men eventually pursue active treatment while on surveillance.  I told Carlton that given the very low volume of his low risk prostate cancer, I would only consider repeat biopsy if there is a significant change in his PSA levels or MRI findings.         Plan   Follow-up in 6 months with PSA prior    30 total minutes spent on the date of the encounter including direct interaction with the patient, performing chart review, documentation and further activities as noted above    Xavier Parra MD   Department of Urology   [facility]                   Again, thank you for allowing me to participate in the care of your patient.        Sincerely,        Xavier Parra MD

## 2024-01-19 NOTE — PROGRESS NOTES
"Virtual Visit Details    Type of service:  Video Visit     Originating Location (pt. Location): Home    Distant Location (provider location):  On-site  Platform used for Video Visit: [facility]      Urology Clinic       HPI  Reid Monaco is a 63 year old male with newly diagnosed prostate cancer, here for follow-up after his prostate biopsy.      The patient denies any major issues after the biopsy     No changes to health, hospitalizations or new diagnoses in the interim    PHYSICAL EXAM  Vitals:  No vitals were obtained today due to virtual visit.    Physical Exam   GENERAL: Healthy, alert and no distress  EYES: Eyes grossly normal to inspection.  No discharge or erythema, or obvious scleral/conjunctival abnormalities.  RESP: No audible wheeze, cough, or visible cyanosis.  No visible retractions or increased work of breathing.    SKIN: Visible skin clear. No significant rash, abnormal pigmentation or lesions.  NEURO: Cranial nerves grossly intact.  Mentation and speech appropriate for age.  PSYCH: Mentation appears normal, affect normal/bright, judgement and insight intact, normal speech and appearance well-groomed.      Labs  Lab Results   Component Value Date    WBC 4.5 07/15/2022    WBC 6.7 02/13/2021     Lab Results   Component Value Date    RBC 5.61 07/15/2022    RBC 5.53 02/13/2021     Lab Results   Component Value Date    HGB 15.9 07/15/2022    HGB 16.0 02/13/2021     Lab Results   Component Value Date    HCT 48.4 07/15/2022    HCT 47.7 02/13/2021     No components found for: \"MCT\"  Lab Results   Component Value Date    MCV 86 07/15/2022    MCV 86 02/13/2021     Lab Results   Component Value Date    MCH 28.3 07/15/2022    MCH 28.9 02/13/2021     Lab Results   Component Value Date    MCHC 32.9 07/15/2022    MCHC 33.5 02/13/2021     Lab Results   Component Value Date    RDW 12.3 07/15/2022    RDW 12.1 02/13/2021     Lab Results   Component Value Date     07/15/2022     02/13/2021        Last " Comprehensive Metabolic Panel:  Sodium   Date Value Ref Range Status   11/16/2023 140 135 - 145 mmol/L Final     Comment:     Reference intervals for this test were updated on 09/26/2023 to more accurately reflect our healthy population. There may be differences in the flagging of prior results with similar values performed with this method. Interpretation of those prior results can be made in the context of the updated reference intervals.    02/13/2021 140 133 - 144 mmol/L Final     Potassium   Date Value Ref Range Status   11/16/2023 4.5 3.4 - 5.3 mmol/L Final   10/10/2022 4.3 3.4 - 5.3 mmol/L Final   02/13/2021 3.4 3.4 - 5.3 mmol/L Final     Chloride   Date Value Ref Range Status   11/16/2023 103 98 - 107 mmol/L Final   10/10/2022 106 94 - 109 mmol/L Final   02/13/2021 104 94 - 109 mmol/L Final     Carbon Dioxide   Date Value Ref Range Status   02/13/2021 30 20 - 32 mmol/L Final     Carbon Dioxide (CO2)   Date Value Ref Range Status   11/16/2023 28 22 - 29 mmol/L Final   10/10/2022 27 20 - 32 mmol/L Final     Anion Gap   Date Value Ref Range Status   11/16/2023 9 7 - 15 mmol/L Final   10/10/2022 5 3 - 14 mmol/L Final   02/13/2021 6 3 - 14 mmol/L Final     Glucose   Date Value Ref Range Status   11/16/2023 91 70 - 99 mg/dL Final   10/10/2022 97 70 - 99 mg/dL Final   02/13/2021 112 (H) 70 - 99 mg/dL Final     Urea Nitrogen   Date Value Ref Range Status   11/16/2023 22.8 8.0 - 23.0 mg/dL Final   10/10/2022 22 7 - 30 mg/dL Final   02/13/2021 18 7 - 30 mg/dL Final     Creatinine   Date Value Ref Range Status   11/16/2023 1.30 (H) 0.67 - 1.17 mg/dL Final   02/13/2021 1.00 0.66 - 1.25 mg/dL Final     GFR Estimate   Date Value Ref Range Status   11/16/2023 62 >60 mL/min/1.73m2 Final   02/13/2021 81 >60 mL/min/[1.73_m2] Final     Comment:     Non  GFR Calc  Starting 12/18/2018, serum creatinine based estimated GFR (eGFR) will be   calculated using the Chronic Kidney Disease Epidemiology Collaboration    (CKD-EPI) equation.       Calcium   Date Value Ref Range Status   11/16/2023 9.5 8.8 - 10.2 mg/dL Final   02/13/2021 9.7 8.5 - 10.1 mg/dL Final     Bilirubin Total   Date Value Ref Range Status   10/31/2023 0.4 <=1.2 mg/dL Final   09/09/2020 0.7 0.2 - 1.3 mg/dL Final     Alkaline Phosphatase   Date Value Ref Range Status   10/31/2023   Final     Comment:     Unsatisfactory specimen - hemolyzed     09/09/2020 45 40 - 150 U/L Final     ALT   Date Value Ref Range Status   10/31/2023   Final     Comment:     Unsatisfactory specimen - hemolyzed    Reference intervals for this test were updated on 6/12/2023 to more accurately reflect our healthy population. There may be differences in the flagging of prior results with similar values performed with this method. Interpretation of those prior results can be made in the context of the updated reference intervals.     09/09/2020 37 0 - 70 U/L Final     AST   Date Value Ref Range Status   10/31/2023   Final     Comment:     Unsatisfactory specimen - hemolyzed    Reference intervals for this test were updated on 6/12/2023 to more accurately reflect our healthy population. There may be differences in the flagging of prior results with similar values performed with this method. Interpretation of those prior results can be made in the context of the updated reference intervals.   09/09/2020 25 0 - 45 U/L Final       Abbott PSA   Date Value Ref Range Status   03/27/2014 0.6 < OR = 4.0 ng/mL Final     Comment:        This test was performed using the Siemens  chemiluminescent method. Values obtained from  different assay methods cannot be used  interchangeably. PSA levels, regardless of  value, should not be interpreted as absolute  evidence of the presence or absence of disease.        PSA   Date Value Ref Range Status   09/09/2020 2.34 0 - 4 ug/L Final     Comment:     Assay Method:  Chemiluminescence using Siemens Vista analyzer   04/03/2019 2.35 0 - 4 ug/L Final     Comment:      Assay Method:  Chemiluminescence using Siemens Vista analyzer   03/30/2018 1.38 0 - 4 ug/L Final     Comment:     Assay Method:  Chemiluminescence using Siemens Vista analyzer     Prostate Specific Antigen Screen   Date Value Ref Range Status   10/31/2023 10.00 (H) 0.00 - 4.50 ng/mL Final   10/10/2022 2.44 0.00 - 4.00 ug/L Final   09/14/2021 2.00 0.00 - 4.00 ug/L Final        Surgical pathology  Surgical Pathology Report                         Case: [id number]                                   Authorizing Provider:  Xavier Parra MD          Collected:           01/12/2024 02:00 PM           Ordering Location:     [facility]  Received:            01/12/2024 02:00 PM                                  [facility]                                                                 Pathologist:           Ijeoma Del Real MD                                                           Specimens:   A) - Prostate, Left Base X2                                                                          B) - Prostate, Left Mid X2                                                                          C) - Prostate, Left [place] X2                                                                          D) - Prostate, Right Base X2                                                                        E) - Prostate, Right Mid X2                                                                          F) - Prostate, Right [place] X2                                                                        G) - Prostate, Target Lesion x2                                                            Final Diagnosis   A.  Prostate gland, left  base, needle biopsies X2:  - Negative for malignancy.     B.  Prostate gland, left  mid, needle biopsies X2:  - Prostatic acinar adenocarcinoma.      - Nicolas score:  6 (3+3)     - Proportion of tissue involved by tumor:  <5%     - Number of tissue cores involved by tumor:  1 (out of 2)      - Angiolymphatic invasion:  Not identified     - Perineural invasion:  Not identified     C.  Prostate gland, left  apex, needle biopsies X2:  - Negative for malignancy.     D.  Prostate gland, right base, needle biopsies X2:  - Negative for malignancy.  - Focal mild chronic inflammation.     E.  Prostate gland, right mid, needle biopsies X2:  - Negative for malignancy.  - A small fragment of normal colonic mucosa.     F.  Prostate gland, right apex, needle biopsies X2:  - Negative for malignancy.  - Focal mild chronic inflammation.     G.  Prostate gland, target lesion, needle biopsies X2:  - Negative for malignancy.         Electronically signed by Ijeoma Del Real MD on 1/17/2024 at 11:19 AM     Imaging   MR prostate 12/10/23     FINDINGS:  Size: 5.1 x 3.6 x 4.0 cm, 38 grams  Hemorrhage: Absent  Peripheral zone: Heterogeneous on T2-weighted images. Suspicious  lesions as detailed below.  Transition zone: Enlarged with BPH changes. Transition zone nodules  which are circumscribed or mostly encapsulated without diffusion  restriction.  PI-RADS 2.  No highly suspicious nodules.     Lesion(s) in rank order of severity (highest score- to lowest score,  then by size)      Lesion 1:  Location: Right mid gland peripheral zone at the 7 o'clock position  relative to the urethra. Series 7 image 53.  Size: 10 mm  T2 description: Ill-defined hypodense  T2 numerical assessment: 3  DWI description: Mild diffusion restriction  DWI numerical assessment: 3  DCE assessment: Positive    Prostate margin: Capsular abutment 6-15 mm  Lesion overall PI-RADS category: 4     Neurovascular bundles: No neurovascular bundle involvement by  malignancy.  Seminal vesicles: No seminal vesicle involvement by malignancy.  Lymph nodes: No lymph node involvement  Bones: No suspicious lesions  Other pelvic organs: No additional findings.     ASSESSMENT AND PLAN  63 year old male with low risk prostate cancer    We had an extensive discussion about  the significance of localized, prostate cancer. We discussed the options for treatment of a localized prostate cancer including active surveillance, brachytherapy, external beam radiation therapy, and surgical removal.       The majority of our discussion related to active surveillance for prostate cancer. We discussed that this is a common treatment decision for men with very low and low risk prostate cancer and is endorsed by major urologic guidelines. We discussed the overall slow growth of most prostate cancers and the long history of data suggesting the safety of this approach. We discussed the ProtecT trial and that this randomized study demonstrated no differences in survival for men comparing active surveillance to radiation or surgery. We discussed that surveillance typically involves PSA rechecks every 6 months with intermittent repeat MRIs and repeat biopsies. We also discussed that approximately half of men eventually pursue active treatment while on surveillance.  I told Carlton that given the very low volume of his low risk prostate cancer, I would only consider repeat biopsy if there is a significant change in his PSA levels or MRI findings.         Plan   Follow-up in 6 months with PSA prior    30 total minutes spent on the date of the encounter including direct interaction with the patient, performing chart review, documentation and further activities as noted above    Xavier Parra MD   Department of Urology   [facility]

## 2024-01-19 NOTE — NURSING NOTE
Is the patient currently in the state of [place]? YES    Visit mode:VIDEO    If the visit is dropped, the patient can be reconnected by: VIDEO VISIT: Text to cell phone:   Telephone Information:   Mobile [phone]       Will anyone else be joining the visit? NO  (If patient encounters technical issues they should call [phone])    How would you like to obtain your AVS? MyChart    Are changes needed to the allergy or medication list?  Pt states his allergies and medications were up-to-date during his echeck-in.     Reason for visit: RECHECK    Bg [place]

## 2024-01-19 NOTE — LETTER
"    1/19/2024         RE: Reid Monaco  [address] [facility] [address] [address]        Dear Colleague,    Thank you for referring your patient, Reid Monaco, to the [facility]. Please see a copy of my visit note below.    Virtual Visit Details    Type of service:  Video Visit     Originating Location (pt. Location): Home    Distant Location (provider location):  On-site  Platform used for Video Visit: [facility]      Urology Clinic       HPI  Reid Monaco is a 63 year old male with newly diagnosed prostate cancer, here for follow-up after his prostate biopsy.      The patient denies any major issues after the biopsy     No changes to health, hospitalizations or new diagnoses in the interim    PHYSICAL EXAM  Vitals:  No vitals were obtained today due to virtual visit.    Physical Exam   GENERAL: Healthy, alert and no distress  EYES: Eyes grossly normal to inspection.  No discharge or erythema, or obvious scleral/conjunctival abnormalities.  RESP: No audible wheeze, cough, or visible cyanosis.  No visible retractions or increased work of breathing.    SKIN: Visible skin clear. No significant rash, abnormal pigmentation or lesions.  NEURO: Cranial nerves grossly intact.  Mentation and speech appropriate for age.  PSYCH: Mentation appears normal, affect normal/bright, judgement and insight intact, normal speech and appearance well-groomed.      Labs  Lab Results   Component Value Date    WBC 4.5 07/15/2022    WBC 6.7 02/13/2021     Lab Results   Component Value Date    RBC 5.61 07/15/2022    RBC 5.53 02/13/2021     Lab Results   Component Value Date    HGB 15.9 07/15/2022    HGB 16.0 02/13/2021     Lab Results   Component Value Date    HCT 48.4 07/15/2022    HCT 47.7 02/13/2021     No components found for: \"MCT\"  Lab Results   Component Value Date    MCV 86 07/15/2022    MCV 86 02/13/2021     Lab Results   Component Value Date    MCH 28.3 07/15/2022    MCH 28.9 02/13/2021     Lab " Results   Component Value Date    MCHC 32.9 07/15/2022    MCHC 33.5 02/13/2021     Lab Results   Component Value Date    RDW 12.3 07/15/2022    RDW 12.1 02/13/2021     Lab Results   Component Value Date     07/15/2022     02/13/2021        Last Comprehensive Metabolic Panel:  Sodium   Date Value Ref Range Status   11/16/2023 140 135 - 145 mmol/L Final     Comment:     Reference intervals for this test were updated on 09/26/2023 to more accurately reflect our healthy population. There may be differences in the flagging of prior results with similar values performed with this method. Interpretation of those prior results can be made in the context of the updated reference intervals.    02/13/2021 140 133 - 144 mmol/L Final     Potassium   Date Value Ref Range Status   11/16/2023 4.5 3.4 - 5.3 mmol/L Final   10/10/2022 4.3 3.4 - 5.3 mmol/L Final   02/13/2021 3.4 3.4 - 5.3 mmol/L Final     Chloride   Date Value Ref Range Status   11/16/2023 103 98 - 107 mmol/L Final   10/10/2022 106 94 - 109 mmol/L Final   02/13/2021 104 94 - 109 mmol/L Final     Carbon Dioxide   Date Value Ref Range Status   02/13/2021 30 20 - 32 mmol/L Final     Carbon Dioxide (CO2)   Date Value Ref Range Status   11/16/2023 28 22 - 29 mmol/L Final   10/10/2022 27 20 - 32 mmol/L Final     Anion Gap   Date Value Ref Range Status   11/16/2023 9 7 - 15 mmol/L Final   10/10/2022 5 3 - 14 mmol/L Final   02/13/2021 6 3 - 14 mmol/L Final     Glucose   Date Value Ref Range Status   11/16/2023 91 70 - 99 mg/dL Final   10/10/2022 97 70 - 99 mg/dL Final   02/13/2021 112 (H) 70 - 99 mg/dL Final     Urea Nitrogen   Date Value Ref Range Status   11/16/2023 22.8 8.0 - 23.0 mg/dL Final   10/10/2022 22 7 - 30 mg/dL Final   02/13/2021 18 7 - 30 mg/dL Final     Creatinine   Date Value Ref Range Status   11/16/2023 1.30 (H) 0.67 - 1.17 mg/dL Final   02/13/2021 1.00 0.66 - 1.25 mg/dL Final     GFR Estimate   Date Value Ref Range Status   11/16/2023 62 >60  mL/min/1.73m2 Final   02/13/2021 81 >60 mL/min/[1.73_m2] Final     Comment:     Non  GFR Calc  Starting 12/18/2018, serum creatinine based estimated GFR (eGFR) will be   calculated using the Chronic Kidney Disease Epidemiology Collaboration   (CKD-EPI) equation.       Calcium   Date Value Ref Range Status   11/16/2023 9.5 8.8 - 10.2 mg/dL Final   02/13/2021 9.7 8.5 - 10.1 mg/dL Final     Bilirubin Total   Date Value Ref Range Status   10/31/2023 0.4 <=1.2 mg/dL Final   09/09/2020 0.7 0.2 - 1.3 mg/dL Final     Alkaline Phosphatase   Date Value Ref Range Status   10/31/2023   Final     Comment:     Unsatisfactory specimen - hemolyzed     09/09/2020 45 40 - 150 U/L Final     ALT   Date Value Ref Range Status   10/31/2023   Final     Comment:     Unsatisfactory specimen - hemolyzed    Reference intervals for this test were updated on 6/12/2023 to more accurately reflect our healthy population. There may be differences in the flagging of prior results with similar values performed with this method. Interpretation of those prior results can be made in the context of the updated reference intervals.     09/09/2020 37 0 - 70 U/L Final     AST   Date Value Ref Range Status   10/31/2023   Final     Comment:     Unsatisfactory specimen - hemolyzed    Reference intervals for this test were updated on 6/12/2023 to more accurately reflect our healthy population. There may be differences in the flagging of prior results with similar values performed with this method. Interpretation of those prior results can be made in the context of the updated reference intervals.   09/09/2020 25 0 - 45 U/L Final       Abbott PSA   Date Value Ref Range Status   03/27/2014 0.6 < OR = 4.0 ng/mL Final     Comment:        This test was performed using the Siemens  chemiluminescent method. Values obtained from  different assay methods cannot be used  interchangeably. PSA levels, regardless of  value, should not be interpreted as  absolute  evidence of the presence or absence of disease.        PSA   Date Value Ref Range Status   09/09/2020 2.34 0 - 4 ug/L Final     Comment:     Assay Method:  Chemiluminescence using Siemens Vista analyzer   04/03/2019 2.35 0 - 4 ug/L Final     Comment:     Assay Method:  Chemiluminescence using Siemens Vista analyzer   03/30/2018 1.38 0 - 4 ug/L Final     Comment:     Assay Method:  Chemiluminescence using Siemens Vista analyzer     Prostate Specific Antigen Screen   Date Value Ref Range Status   10/31/2023 10.00 (H) 0.00 - 4.50 ng/mL Final   10/10/2022 2.44 0.00 - 4.00 ug/L Final   09/14/2021 2.00 0.00 - 4.00 ug/L Final        Surgical pathology  Surgical Pathology Report                         Case: [id number]                                   Authorizing Provider:  Xavier Parra MD          Collected:           01/12/2024 02:00 PM           Ordering Location:     [facility]  Received:            01/12/2024 02:00 PM                                  [facility]                                                                 Pathologist:           Ijeoma Del Real MD                                                           Specimens:   A) - Prostate, Left Base X2                                                                          B) - Prostate, Left Mid X2                                                                          C) - Prostate, Left [place] X2                                                                          D) - Prostate, Right Base X2                                                                        E) - Prostate, Right Mid X2                                                                          F) - Prostate, Right [place] X2                                                                        G) - Prostate, Target Lesion x2                                                            Final Diagnosis   A.  Prostate gland, left  base, needle biopsies X2:  -  Negative for malignancy.     B.  Prostate gland, left  mid, needle biopsies X2:  - Prostatic acinar adenocarcinoma.      - Nicolas score:  6 (3+3)     - Proportion of tissue involved by tumor:  <5%     - Number of tissue cores involved by tumor:  1 (out of 2)     - Angiolymphatic invasion:  Not identified     - Perineural invasion:  Not identified     C.  Prostate gland, left  apex, needle biopsies X2:  - Negative for malignancy.     D.  Prostate gland, right base, needle biopsies X2:  - Negative for malignancy.  - Focal mild chronic inflammation.     E.  Prostate gland, right mid, needle biopsies X2:  - Negative for malignancy.  - A small fragment of normal colonic mucosa.     F.  Prostate gland, right apex, needle biopsies X2:  - Negative for malignancy.  - Focal mild chronic inflammation.     G.  Prostate gland, target lesion, needle biopsies X2:  - Negative for malignancy.         Electronically signed by Ijeoma Del Real MD on 1/17/2024 at 11:19 AM     Imaging   MR prostate 12/10/23     FINDINGS:  Size: 5.1 x 3.6 x 4.0 cm, 38 grams  Hemorrhage: Absent  Peripheral zone: Heterogeneous on T2-weighted images. Suspicious  lesions as detailed below.  Transition zone: Enlarged with BPH changes. Transition zone nodules  which are circumscribed or mostly encapsulated without diffusion  restriction.  PI-RADS 2.  No highly suspicious nodules.     Lesion(s) in rank order of severity (highest score- to lowest score,  then by size)      Lesion 1:  Location: Right mid gland peripheral zone at the 7 o'clock position  relative to the urethra. Series 7 image 53.  Size: 10 mm  T2 description: Ill-defined hypodense  T2 numerical assessment: 3  DWI description: Mild diffusion restriction  DWI numerical assessment: 3  DCE assessment: Positive    Prostate margin: Capsular abutment 6-15 mm  Lesion overall PI-RADS category: 4     Neurovascular bundles: No neurovascular bundle involvement by  malignancy.  Seminal vesicles: No seminal  vesicle involvement by malignancy.  Lymph nodes: No lymph node involvement  Bones: No suspicious lesions  Other pelvic organs: No additional findings.     ASSESSMENT AND PLAN  63 year old male with low risk prostate cancer    We had an extensive discussion about the significance of localized, prostate cancer. We discussed the options for treatment of a localized prostate cancer including active surveillance, brachytherapy, external beam radiation therapy, and surgical removal.       The majority of our discussion related to active surveillance for prostate cancer. We discussed that this is a common treatment decision for men with very low and low risk prostate cancer and is endorsed by major urologic guidelines. We discussed the overall slow growth of most prostate cancers and the long history of data suggesting the safety of this approach. We discussed the ProtecT trial and that this randomized study demonstrated no differences in survival for men comparing active surveillance to radiation or surgery. We discussed that surveillance typically involves PSA rechecks every 6 months with intermittent repeat MRIs and repeat biopsies. We also discussed that approximately half of men eventually pursue active treatment while on surveillance.  I told Carlton that given the very low volume of his low risk prostate cancer, I would only consider repeat biopsy if there is a significant change in his PSA levels or MRI findings.         Plan   Follow-up in 6 months with PSA prior    30 total minutes spent on the date of the encounter including direct interaction with the patient, performing chart review, documentation and further activities as noted above    Xavier Parra MD   Department of Urology   [facility]                   Again, thank you for allowing me to participate in the care of your patient.        Sincerely,        Xavier Parra MD

## 2024-04-26 ENCOUNTER — TRANSFERRED RECORDS (OUTPATIENT)
Dept: HEALTH INFORMATION MANAGEMENT | Facility: CLINIC | Age: 64
End: 2024-04-26

## 2024-05-01 ENCOUNTER — LAB (OUTPATIENT)
Dept: LAB | Facility: CLINIC | Age: 64
End: 2024-05-01
Payer: COMMERCIAL

## 2024-05-01 DIAGNOSIS — R97.20 ELEVATED PROSTATE SPECIFIC ANTIGEN (PSA): ICD-10-CM

## 2024-05-01 PROCEDURE — 36415 COLL VENOUS BLD VENIPUNCTURE: CPT

## 2024-05-01 PROCEDURE — 84153 ASSAY OF PSA TOTAL: CPT

## 2024-05-02 LAB — PSA SERPL DL<=0.01 NG/ML-MCNC: 5.07 NG/ML (ref 0–4.5)

## 2024-05-21 ENCOUNTER — MYC MEDICAL ADVICE (OUTPATIENT)
Dept: FAMILY MEDICINE | Facility: CLINIC | Age: 64
End: 2024-05-21
Payer: COMMERCIAL

## 2024-05-21 DIAGNOSIS — E78.1 HYPERTRIGLYCERIDEMIA: Primary | ICD-10-CM

## 2024-05-21 RX ORDER — FENOFIBRATE 54 MG/1
54 TABLET ORAL DAILY
Qty: 90 TABLET | Refills: 3 | Status: SHIPPED | OUTPATIENT
Start: 2024-05-21

## 2024-05-28 ENCOUNTER — OFFICE VISIT (OUTPATIENT)
Dept: FAMILY MEDICINE | Facility: CLINIC | Age: 64
End: 2024-05-28
Payer: COMMERCIAL

## 2024-05-28 VITALS
BODY MASS INDEX: 28.51 KG/M2 | HEIGHT: 69 IN | HEART RATE: 57 BPM | RESPIRATION RATE: 14 BRPM | TEMPERATURE: 97.2 F | DIASTOLIC BLOOD PRESSURE: 68 MMHG | SYSTOLIC BLOOD PRESSURE: 120 MMHG | WEIGHT: 192.5 LBS | OXYGEN SATURATION: 97 %

## 2024-05-28 DIAGNOSIS — H26.9 CATARACT OF BOTH EYES, UNSPECIFIED CATARACT TYPE: ICD-10-CM

## 2024-05-28 DIAGNOSIS — E78.1 HYPERTRIGLYCERIDEMIA: ICD-10-CM

## 2024-05-28 DIAGNOSIS — Z01.818 PREOPERATIVE EXAMINATION: Primary | ICD-10-CM

## 2024-05-28 PROCEDURE — 99214 OFFICE O/P EST MOD 30 MIN: CPT | Performed by: FAMILY MEDICINE

## 2024-05-28 NOTE — PROGRESS NOTES
Preoperative Evaluation  [address] [place]  [facility] [address]  Phone: [phone]  Primary Provider: Fady Castro DO  Pre-op Performing Provider: Fady Castro DO  May 28, 2024           5/23/2024   Surgical Information   What procedure is being done? Cataract surgery   Facility or Hospital where procedure/surgery will be performed: [facility]   Who is doing the procedure / surgery? Dr Hager   Date of surgery / procedure: 06/06/2024   Time of surgery / procedure: 11 am   Where do you plan to recover after surgery? at home with family     Fax number for surgical facility: Note does not need to be faxed, will be available electronically in Epic.    Assessment & Plan     The proposed surgical procedure is considered LOW risk.    Preoperative examination    Cataract of both eyes, unspecified cataract type    Hypertriglyceridemia  - Lipid panel reflex to direct LDL Fasting; Future         - No identified additional risk factors other than previously addressed       Recommendation  Approval given to proceed with proposed procedure, without further diagnostic evaluation.        Escobar Vincent is a 64 year old, presenting for the following:  Pre-Op Exam          5/28/2024     9:36 AM   Additional Questions   Roomed by Janine CMA   Accompanied by Self     HPI related to upcoming procedure: history of bilateral cataracts        5/23/2024   Pre-Op Questionnaire   Have you ever had a heart attack or stroke? No   Have you ever had surgery on your heart or blood vessels, such as a stent placement, a coronary artery bypass, or surgery on an artery in your head, neck, heart, or legs? No   Do you have chest pain with activity? No   Do you have a history of heart failure? No   Do you currently have a cold, bronchitis or symptoms of other infection? No   Do you have a cough, shortness of breath, or wheezing? No   Do you or anyone in your family have previous  history of blood clots? (!) YES his sister has had history of blood clots.    Do you or does anyone in your family have a serious bleeding problem such as prolonged bleeding following surgeries or cuts? No   Have you ever had problems with anemia or been told to take iron pills? No   Have you had any abnormal blood loss such as black, tarry or bloody stools? No   Have you ever had a blood transfusion? No   Are you willing to have a blood transfusion if it is medically needed before, during, or after your surgery? Yes   Have you or any of your relatives ever had problems with anesthesia? No   Do you have sleep apnea, excessive snoring or daytime drowsiness? No   Do you have any artifical heart valves or other implanted medical devices like a pacemaker, defibrillator, or continuous glucose monitor? No   Do you have artificial joints? No   Are you allergic to latex? No     Health Care Directive  Patient does not have a Health Care Directive or Living Will    Preoperative Review of    reviewed - no record of controlled substances prescribed.      Status of Chronic Conditions:  See problem list for active medical problems.  Problems all longstanding and stable, except as noted/documented.  See ROS for pertinent symptoms related to these conditions.    Patient Active Problem List    Diagnosis Date Noted    Viral gastroenteritis 02/18/2021     Priority: Medium    History of colonic polyps 04/20/2017     Priority: Medium    ACP (advance care planning) 03/11/2015     Priority: Medium     Advance Care Planning:   ACP Review and Resources Provided:  Reviewed chart for advance care plan.  Reid HERNÁNDEZ Jacinda has no plan or code status on file. Discussed available resources and provided with information. Confirmed code status reflects current choices pending further ACP discussions.  Confirmed/documented designated decision maker(s). See permanent comments section of demographics in clinical tab.   Added by Sarah Lizarraga on  "3/11/2015            Bradycardia 2014     Priority: Medium    Hemorrhoids, internal, with bleeding 2012     Priority: Medium    Family history of GI malignancy 2010     Priority: Medium     Father - colon cancer 61      Hypertriglyceridemia 2009     Priority: Medium    Health Care Home 2013     Priority: Low     State Tier Level:  Tier 0  Status:  n/a  Care Coordinator:     See Letters for [facility] Care Plan            Past Medical History:   Diagnosis Date    Family history of GI malignancy 2010    Father - colon cancer 61     History of colonic polyps 2017    Hypertriglyceridemia 2009     Past Surgical History:   Procedure Laterality Date    BIOPSY      anal tag removed    MRI BIOPSY PROSTATE Bilateral 2024    [facility]    SOFT TISSUE SURGERY      remove torn miniscus    VASECTOMY  10/2008    [facility] COLONOSCOPY THRU STOMA, DIAGNOSTIC      normal     Current Outpatient Medications   Medication Sig Dispense Refill    fenofibrate (LOFIBRA) 54 MG tablet Take 1 tablet (54 mg) by mouth daily 90 tablet 3       No Known Allergies     Social History     Tobacco Use    Smoking status: Former     Current packs/day: 0.00     Average packs/day: 0.5 packs/day for 5.0 years (2.5 ttl pk-yrs)     Types: Cigarettes     Start date: 1980     Quit date: 1985     Years since quittin.4    Smokeless tobacco: Never    Tobacco comments:     intermittent smoker over 4 years, 40 YEARS AGO   Substance Use Topics    Alcohol use: Yes     Alcohol/week: 1.7 standard drinks of alcohol     Comment: approx 1 to 3 drinks per week     History   Drug Use No             Review of Systems  Constitutional, HEENT, cardiovascular, pulmonary, gi and gu systems are negative, except as otherwise noted.    Objective    /68   Pulse 57   Temp 97.2  F (36.2  C) (Tympanic)   Resp 14   Ht 1.753 m (5' 9\")   Wt 87.3 kg (192 lb 8 oz)   SpO2 97%   BMI 28.43 kg/m     Estimated body mass " "index is 28.43 kg/m  as calculated from the following:    Height as of this encounter: 1.753 m (5' 9\").    Weight as of this encounter: 87.3 kg (192 lb 8 oz).  Physical Exam  GENERAL: alert and no distress  EYES: Eyes grossly normal to inspection  HENT: ear canals and TM's normal, nose and mouth without ulcers or lesions  NECK: no adenopathy, no asymmetry, masses, or scars  RESP: lungs clear to auscultation - no rales, rhonchi or wheezes  CV: regular rates and rhythm, normal S1 S2, no S3 or S4, and no murmur, click or rub  MS: no gross musculoskeletal defects noted  PSYCH: mentation appears normal, affect normal/bright    Recent Labs   Lab Test 11/16/23  1537 10/31/23  1111    140   POTASSIUM 4.5 6.0*   CR 1.30* 1.04        Diagnostics  No labs were ordered during this visit.   No EKG required for low risk surgery (cataract, skin procedure, breast biopsy, etc).    Revised Cardiac Risk Index (RCRI)  The patient has the following serious cardiovascular risks for perioperative complications:   - No serious cardiac risks = 0 points     RCRI Interpretation: 0 points: Class I (very low risk - 0.4% complication rate)         Signed Electronically by: Fady Castro DO  Copy of this evaluation report is provided to requesting physician.  "

## 2024-07-19 ENCOUNTER — TELEPHONE (OUTPATIENT)
Dept: UROLOGY | Facility: CLINIC | Age: 64
End: 2024-07-19

## 2024-07-19 DIAGNOSIS — R97.20 ELEVATED PROSTATE SPECIFIC ANTIGEN (PSA): Primary | ICD-10-CM

## 2024-07-19 NOTE — TELEPHONE ENCOUNTER
Spoke with pt to reschedule his Urology follow up since the appointment today was incorrectly scheduled and cancelled. Patient mentioned he went to get a PSA lab today but was unable to due to no order being placed. Sent a message to get the lab order placed, and offered to reschedule his lab and follow up appointment but patient doesn't want to schedule until there is a lab order. Will call back to schedule once order is placed.

## 2024-07-22 NOTE — TELEPHONE ENCOUNTER
Patient confirmed scheduled appointment:  Date: Sept 20th   Time: 8:45am  Visit type: Return   Provider: Shamar Kulkarni   Location: [facility]  Testing/imaging: PSA scheduled 7/25   Additional notes: Schedule PSA and follow up with PA per staff message

## 2024-07-30 ENCOUNTER — LAB (OUTPATIENT)
Dept: LAB | Facility: CLINIC | Age: 64
End: 2024-07-30
Attending: UROLOGY
Payer: COMMERCIAL

## 2024-07-30 DIAGNOSIS — R97.20 ELEVATED PROSTATE SPECIFIC ANTIGEN (PSA): ICD-10-CM

## 2024-07-30 LAB — PSA SERPL DL<=0.01 NG/ML-MCNC: 5.95 NG/ML (ref 0–4.5)

## 2024-07-30 PROCEDURE — 36415 COLL VENOUS BLD VENIPUNCTURE: CPT

## 2024-07-30 PROCEDURE — 84153 ASSAY OF PSA TOTAL: CPT

## 2024-08-19 ENCOUNTER — MYC MEDICAL ADVICE (OUTPATIENT)
Dept: FAMILY MEDICINE | Facility: CLINIC | Age: 64
End: 2024-08-19
Payer: COMMERCIAL

## 2024-08-19 DIAGNOSIS — L98.9 SKIN LESION: Primary | ICD-10-CM

## 2024-08-26 ENCOUNTER — LAB (OUTPATIENT)
Dept: LAB | Facility: CLINIC | Age: 64
End: 2024-08-26
Payer: COMMERCIAL

## 2024-08-26 DIAGNOSIS — E78.1 HYPERTRIGLYCERIDEMIA: ICD-10-CM

## 2024-08-26 LAB
CHOLEST SERPL-MCNC: 191 MG/DL
FASTING STATUS PATIENT QL REPORTED: YES
HDLC SERPL-MCNC: 38 MG/DL
LDLC SERPL CALC-MCNC: 110 MG/DL
NONHDLC SERPL-MCNC: 153 MG/DL
TRIGL SERPL-MCNC: 217 MG/DL

## 2024-08-26 PROCEDURE — 80061 LIPID PANEL: CPT

## 2024-08-26 PROCEDURE — 36415 COLL VENOUS BLD VENIPUNCTURE: CPT

## 2024-08-27 ENCOUNTER — MYC MEDICAL ADVICE (OUTPATIENT)
Dept: FAMILY MEDICINE | Facility: CLINIC | Age: 64
End: 2024-08-27
Payer: COMMERCIAL

## 2024-09-03 ENCOUNTER — APPOINTMENT (OUTPATIENT)
Dept: URBAN - METROPOLITAN AREA CLINIC 253 | Age: 64
Setting detail: DERMATOLOGY
End: 2024-09-03

## 2024-09-03 VITALS — WEIGHT: 190 LBS | HEIGHT: 69 IN | RESPIRATION RATE: 14 BRPM

## 2024-09-03 DIAGNOSIS — D22 MELANOCYTIC NEVI: ICD-10-CM

## 2024-09-03 DIAGNOSIS — L82.0 INFLAMED SEBORRHEIC KERATOSIS: ICD-10-CM

## 2024-09-03 DIAGNOSIS — L91.8 OTHER HYPERTROPHIC DISORDERS OF THE SKIN: ICD-10-CM

## 2024-09-03 PROBLEM — D22.5 MELANOCYTIC NEVI OF TRUNK: Status: ACTIVE | Noted: 2024-09-03

## 2024-09-03 PROCEDURE — OTHER MIPS QUALITY: OTHER

## 2024-09-03 PROCEDURE — 99202 OFFICE O/P NEW SF 15 MIN: CPT | Mod: 25

## 2024-09-03 PROCEDURE — OTHER DEFER: OTHER

## 2024-09-03 PROCEDURE — OTHER BENIGN DESTRUCTION (GENITALS): OTHER

## 2024-09-03 PROCEDURE — OTHER SKIN TAG REMOVAL: OTHER

## 2024-09-03 PROCEDURE — 11200 RMVL SKIN TAGS UP TO&INC 15: CPT

## 2024-09-03 PROCEDURE — OTHER COUNSELING: OTHER

## 2024-09-03 ASSESSMENT — LOCATION DETAILED DESCRIPTION DERM
LOCATION DETAILED: LEFT SUPERIOR UPPER BACK
LOCATION DETAILED: INFERIOR LUMBAR SPINE
LOCATION DETAILED: RIGHT SUPERIOR UPPER BACK
LOCATION DETAILED: RIGHT SUPERIOR MEDIAL UPPER BACK
LOCATION DETAILED: LEFT SCROTUM
LOCATION DETAILED: LEFT SUPERIOR MEDIAL UPPER BACK
LOCATION DETAILED: RIGHT MEDIAL TRAPEZIAL NECK

## 2024-09-03 ASSESSMENT — LOCATION SIMPLE DESCRIPTION DERM
LOCATION SIMPLE: SCROTUM
LOCATION SIMPLE: RIGHT UPPER BACK
LOCATION SIMPLE: LEFT UPPER BACK
LOCATION SIMPLE: POSTERIOR NECK
LOCATION SIMPLE: LOWER BACK

## 2024-09-03 ASSESSMENT — LOCATION ZONE DERM
LOCATION ZONE: NECK
LOCATION ZONE: GENITALIA
LOCATION ZONE: TRUNK

## 2024-09-03 NOTE — HPI: SKIN LESION
Is This A New Presentation, Or A Follow-Up?: Moles
Additional History: The patient reports scattered moles on his back that his primary told him he could have removed. He states they rub on clothing and are tender with pressure applied. The patient denies any concern/suspicion for malignancy by him or his primary.

## 2024-09-03 NOTE — PROCEDURE: BENIGN DESTRUCTION (GENITALS)
Include Z78.9 (Other Specified Conditions Influencing Health Status) As An Associated Diagnosis?: No
Method: liquid nitrogen
Consent: The patient's consent was obtained including but not limited to risks of crusting, scabbing, blistering, scarring, darker or lighter pigmentary change, recurrence, incomplete removal and infection.
Detail Level: Detailed
Medical Necessity Clause: This procedure was medically necessary because the lesions that were treated were:
Post-Care Instructions: I reviewed with the patient in detail post-care instructions. Patient is to wear sunprotection, and avoid picking at any of the treated lesions. Pt may apply Vaseline to crusted or scabbing areas.
Medical Necessity Information: It is in your best interest to select a reason for this procedure from the list below. All of these items fulfill various CMS LCD requirements except the new and changing color options.
Render Post-Care Instructions In Note?: yes
Warning: This plan will only bill for destructions on the Penis and Vulva. If you select the Scrotum it will not bill.

## 2024-09-03 NOTE — PROCEDURE: SKIN TAG REMOVAL
Add Associated Diagnoses If Applicable When Selecting Medical Necessity: Yes
Consent: Written consent obtained and the risks of skin tag removal was reviewed with the patient including but not limited to bleeding, pigmentary change, infection, pain, and remote possibility of scarring.
Include Z78.9 (Other Specified Conditions Influencing Health Status) As An Associated Diagnosis?: No
Anesthesia Type: 2% lidocaine with epinephrine and a 1:10 solution of 8.4% sodium bicarbonate
Anesthesia Volume In Cc: 0.3
Medical Necessity Clause: This procedure was medically necessary because the lesions that were treated were:
Medical Necessity Information: It is in your best interest to select a reason for this procedure from the list below. All of these items fulfill various CMS LCD requirements except the new and changing color options.
Hemostasis: Drysol
Detail Level: Detailed

## 2024-09-04 ENCOUNTER — APPOINTMENT (OUTPATIENT)
Dept: URBAN - METROPOLITAN AREA CLINIC 253 | Age: 64
Setting detail: DERMATOLOGY
End: 2024-09-04

## 2024-09-04 VITALS — RESPIRATION RATE: 14 BRPM | WEIGHT: 190 LBS | HEIGHT: 69 IN

## 2024-09-04 VITALS — WEIGHT: 190 LBS | HEIGHT: 69 IN

## 2024-09-04 DIAGNOSIS — D22 MELANOCYTIC NEVI: ICD-10-CM

## 2024-09-04 PROBLEM — D22.5 MELANOCYTIC NEVI OF TRUNK: Status: ACTIVE | Noted: 2024-09-04

## 2024-09-04 PROCEDURE — 11400 EXC TR-EXT B9+MARG 0.5 CM<: CPT | Mod: 79

## 2024-09-04 PROCEDURE — OTHER MIPS QUALITY: OTHER

## 2024-09-04 PROCEDURE — OTHER COUNSELING: OTHER

## 2024-09-04 PROCEDURE — 11400 EXC TR-EXT B9+MARG 0.5 CM<: CPT | Mod: 76,79

## 2024-09-04 PROCEDURE — OTHER PHOTO-DOCUMENTATION: OTHER

## 2024-09-04 PROCEDURE — OTHER PUNCH EXCISION: OTHER

## 2024-09-04 ASSESSMENT — LOCATION SIMPLE DESCRIPTION DERM
LOCATION SIMPLE: RIGHT UPPER BACK
LOCATION SIMPLE: LEFT UPPER BACK

## 2024-09-04 ASSESSMENT — LOCATION DETAILED DESCRIPTION DERM
LOCATION DETAILED: LEFT SUPERIOR UPPER BACK
LOCATION DETAILED: RIGHT SUPERIOR MEDIAL UPPER BACK

## 2024-09-04 ASSESSMENT — LOCATION ZONE DERM: LOCATION ZONE: TRUNK

## 2024-09-04 NOTE — PROCEDURE: PUNCH EXCISION
Medical Necessity Clause: This procedure was medically necessary because the lesion that was treated was:
Detail Level: Detailed
Size Of Lesion (*Required): 0.4
Size Of Margin In Cm: 0
Punch Size In Mm: 4
Repair Type: Intermediate
Complex Requirements: Extensive Undermining Performed?: No
Undermining Type: Entire Wound
Debridement Text: The wound edges were debrided prior to proceeding with the closure to facilitate wound healing.
Helical Rim Text: The closure involved the helical rim.
Vermilion Border Text: The closure involved the vermilion border.
Nostril Rim Text: The closure involved the nostril rim.
Retention Suture Text: Retention sutures were placed to support the closure and prevent dehiscence.
Include Undermining Statement In The Repair Note?: Yes
Anesthesia Type: 1% lidocaine with epinephrine and a 1:10 solution of 8.4% sodium bicarbonate
Anesthesia Volume In Cc: 0.5
Hemostasis: Pressure
Epidermal Sutures: 3-0 Prolene
Number Of Epidermal Sutures (Optional): 2
Epidermal Closure: simple interrupted
Wound Care: Petrolatum
Wound Dressings: a bandage
Suture Removal: 14 days
Lab: -[id number]
Path Notes (To The Dermatopathologist): Please check margins.
1.5 Mm Punch Excision Text: A 1.5 mm punch biopsy was used to excise the lesion to the level of the subcutaneous fat.  Blunt dissection was used to free the lesion from the surrounding tissues and the lesion was removed.
2 Mm Punch Excision Text: A 2 mm punch biopsy was used to excise the lesion to the level of the subcutaneous fat.  Blunt dissection was used to free the lesion from the surrounding tissues and the lesion was removed.
2.5 Mm Punch Excision Text: A 2.5 mm punch biopsy was used to excise the lesion to the level of the subcutaneous fat.  Blunt dissection was used to free the lesion from the surrounding tissues and the lesion was removed.
3 Mm Punch Excision Text: A 3 mm punch biopsy was used to excise the lesion to the level of the subcutaneous fat.  Blunt dissection was used to free the lesion from the surrounding tissues and the lesion was removed.
3.5 Mm Punch Excision Text: A 3.5 mm punch biopsy was used to excise the lesion to the level of the subcutaneous fat.  Blunt dissection was used to free the lesion from the surrounding tissues and the lesion was removed.
4 Mm Punch Excision Text: A 4 mm punch biopsy was used to excise the lesion to the level of the subcutaneous fat.  Blunt dissection was used to free the lesion from the surrounding tissues and the lesion was removed.
4.5 Mm Punch Excision Text: A 4.5 mm punch biopsy was used to excise the lesion to the level of the subcutaneous fat.  Blunt dissection was used to free the lesion from the surrounding tissues and the lesion was removed.
5 Mm Punch Excision Text: A 5 mm punch biopsy was used to excise the lesion to the level of the subcutaneous fat.  Blunt dissection was used to free the lesion from the surrounding tissues and the lesion was removed.
6 Mm Punch Excision Text: A 6 mm punch biopsy was used to excise the lesion to the level of the subcutaneous fat.  Blunt dissection was used to free the lesion from the surrounding tissues and the lesion was removed.
7 Mm Punch Excision Text: A 7 mm punch biopsy was used to excise the lesion to the level of the subcutaneous fat.  Blunt dissection was used to free the lesion from the surrounding tissues and the lesion was removed.
8 Mm Punch Excision Text: A 8 mm punch biopsy was used to excise the lesion to the level of the subcutaneous fat.  Blunt dissection was used to free the lesion from the surrounding tissues and the lesion was removed.
10 Mm Punch Excision Text: A 10 mm punch biopsy was used to excise the lesion to the level of the subcutaneous fat.  Blunt dissection was used to free the lesion from the surrounding tissues and the lesion was removed.
12 Mm Punch Excision Text: A 12 mm punch biopsy was used to excise the lesion to the level of the subcutaneous fat.  Blunt dissection was used to free the lesion from the surrounding tissues and the lesion was removed.
Consent was obtained from the patient. The risks and benefits to therapy were discussed in detail. Specifically, the risks of infection, scarring, bleeding, prolonged wound healing, incomplete removal, allergy to anesthesia, nerve injury and recurrence were addressed. Prior to the procedure, the treatment site was clearly identified and confirmed by the patient. All components of Universal Protocol/PAUSE Rule completed.
Post-Care Instructions: I reviewed with the patient in detail post-care instructions. Patient is to keep the biopsy site dry overnight, and then apply bacitracin twice daily until healed. Patient may apply hydrogen peroxide soaks to remove any crusting.
Notification Instructions: Patient will be notified of biopsy results. However, patient instructed to call the office if not contacted within 2 weeks.
Billing Type: Third-Party Bill
Punch Size In Mm: 5
Number Of Epidermal Sutures (Optional): 3

## 2024-09-19 ENCOUNTER — APPOINTMENT (OUTPATIENT)
Dept: URBAN - METROPOLITAN AREA CLINIC 253 | Age: 64
Setting detail: DERMATOLOGY
End: 2024-09-19

## 2024-09-19 DIAGNOSIS — Z48.02 ENCOUNTER FOR REMOVAL OF SUTURES: ICD-10-CM

## 2024-09-19 PROCEDURE — OTHER PHOTO-DOCUMENTATION: OTHER

## 2024-09-19 PROCEDURE — OTHER PRESCRIPTION: OTHER

## 2024-09-19 PROCEDURE — OTHER SUTURE REMOVAL (GLOBAL PERIOD): OTHER

## 2024-09-19 RX ORDER — GENTAMICIN SULFATE 1 MG/G
OINTMENT TOPICAL
Qty: 15 | Refills: 0 | Status: ERX | COMMUNITY
Start: 2024-09-19

## 2024-09-19 ASSESSMENT — LOCATION SIMPLE DESCRIPTION DERM
LOCATION SIMPLE: LEFT UPPER BACK
LOCATION SIMPLE: RIGHT UPPER BACK

## 2024-09-19 ASSESSMENT — LOCATION ZONE DERM: LOCATION ZONE: TRUNK

## 2024-09-19 ASSESSMENT — LOCATION DETAILED DESCRIPTION DERM
LOCATION DETAILED: RIGHT SUPERIOR MEDIAL UPPER BACK
LOCATION DETAILED: LEFT SUPERIOR UPPER BACK

## 2024-09-19 NOTE — PROCEDURE: SUTURE REMOVAL (GLOBAL PERIOD)
Detail Level: Detailed
Add 99555 Cpt? (Important Note: In 2017 The Use Of 31088 Is Being Tracked By Cms To Determine Future Global Period Reimbursement For Global Periods): no
Work

## 2024-09-19 NOTE — PROGRESS NOTES
Virtual Visit Details    Type of service:  Video Visit   Video Start Time: 8:43 AM  Video End Time:8:52 AM    Originating Location (pt. Location): Home    Distant Location (provider location):  Off-site  Platform used for Video Visit: [facility]    Visit conducted via real-time audio/video technology by Heath Kulkarni PA-C to the patient in their home.    Subjective      REASON FOR VISIT  [place] 6 prostate cancer follow-up    HISTORY OF PRESENT ILLNESS  Mr. Monaco is a 64 year old male who I am speaking with today in regards to his recently discovered [place] 6 prostate cancer.  Biopsy diagnosing this took place on 1/12/2024 after a PSA elevation up to 10 and a MRI showing a PI-RADS 4 lesion.  Pathology showed 1 core of [place] 6 prostate cancer with less than 5% tumor involvement.  After reviewing the pathology results, opted for active surveillance.    Objective      PHYSICAL EXAMINATION  Deferred given virtual visit.    LABS  Lab Results   Component Value Date    PSA 5.95 (H) 07/30/2024    PSA 5.07 (H) 05/01/2024    PSA 10.00 (H) 10/31/2023    PSA 2.44 10/10/2022    PSA 2.00 09/14/2021    PSA 2.34 09/09/2020    PSA 2.35 04/03/2019    PSA 1.38 03/30/2018      Prostate biopsy from 1/12/2024:  Final Diagnosis   A.  Prostate gland, left  base, needle biopsies X2:  - Negative for malignancy.     B.  Prostate gland, left  mid, needle biopsies X2:  - Prostatic acinar adenocarcinoma.      - Nicolas score:  6 (3+3)     - Proportion of tissue involved by tumor:  <5%     - Number of tissue cores involved by tumor:  1 (out of 2)     - Angiolymphatic invasion:  Not identified     - Perineural invasion:  Not identified     C.  Prostate gland, left  apex, needle biopsies X2:  - Negative for malignancy.     D.  Prostate gland, right base, needle biopsies X2:  - Negative for malignancy.  - Focal mild chronic inflammation.     E.  Prostate gland, right mid, needle biopsies X2:  - Negative for malignancy.  - A small fragment of  normal colonic mucosa.     F.  Prostate gland, right apex, needle biopsies X2:  - Negative for malignancy.  - Focal mild chronic inflammation.     G.  Prostate gland, target lesion, needle biopsies X2:  - Negative for malignancy.     IMAGING  Most recent prostate MRI from 12/23/2023    Narrative & Impression   MRI PROSTATE: 12/10/2023 1:38 PM     CLINICAL HISTORY: Elevated prostate specific antigen (PSA)     Most Recent PSA: 10.0 ug/L on 10/31/2023     Comparison: none.     TECHNIQUE:  The following sequences were obtained: High-resolution axial  T2-weighted, coronal T2-weighted, 3D volumetric T2-weighted, axial  pre-contrast T1, axial diffusion-weighted, axial apparent diffusion  coefficient and axial dynamic contrast-enhanced T1. Postcontrast  images were evaluated on a separate workstation to evaluate dynamic  contrast enhancement. The technique of this exam is PI-RADS v2.1  compliant. Contrast dose: 8.5 ml miko     FINDINGS:  Size: 5.1 x 3.6 x 4.0 cm, 38 grams  Hemorrhage: Absent  Peripheral zone: Heterogeneous on T2-weighted images. Suspicious  lesions as detailed below.  Transition zone: Enlarged with BPH changes. Transition zone nodules  which are circumscribed or mostly encapsulated without diffusion  restriction.  PI-RADS 2.  No highly suspicious nodules.     Lesion(s) in rank order of severity (highest score- to lowest score,  then by size)      Lesion 1:  Location: Right mid gland peripheral zone at the 7 o'clock position  relative to the urethra. Series 7 image 53.  Size: 10 mm  T2 description: Ill-defined hypodense  T2 numerical assessment: 3  DWI description: Mild diffusion restriction  DWI numerical assessment: 3  DCE assessment: Positive    Prostate margin: Capsular abutment 6-15 mm  Lesion overall PI-RADS category: 4     Neurovascular bundles: No neurovascular bundle involvement by  malignancy.  Seminal vesicles: No seminal vesicle involvement by malignancy.  Lymph nodes: No lymph node  involvement  Bones: No suspicious lesions  Other pelvic organs: No additional findings.                                                                         IMPRESSION:  1. Based on the most suspicious abnormality, this exam is  characterized as PIRADS 4 - Clinically significant cancer is likely to  be present.  The most suspicious abnormality is located at the right  posterior mid gland peripheral zone and there is short segment  capsular abutment with low likelihood of minimal extraprostatic  extension.  2. No suspicious adenopathy or evidence of pelvic metastases.     Assessment & Plan    [place] 6 prostate cancer on active surveillance, initial diagnosis in January 2024    It was my pleasure to speak with Carlton in follow-up for his history of [place] 6 prostate cancer  currently on active surveillance with initial diagnosis in January 2024.  After reviewing his clinical history, we discussed the tenets of active surveillance again, specifically that we will plan to get a PSA every 6 months, get a confirmatory biopsy in 18 to 24 months, and then try to de-escalate biopsies, but continue with the frequent PSAs and occasional prostate MRIs.  For now we will plan to see him back in 5 to 6 months with a repeat PSA with a follow-up visit with me shortly afterwards.  At that visit we will discuss a repeat PSA and MRI 6 months after that with a plan to schedule a biopsy thereafter.    Mr. Monaco expressed understanding and agreement to the above discussion and plan and all of his questions were answered to his satisfaction.     PLAN  Repeat PSA in 6 months with follow-up virtual visit with me to discuss results    SIGNED    Heath Kulkarni PA-C      I spent a total of 13 minutes spent on the date of the encounter doing chart review, history and exam, documentation, and further activities as noted above.

## 2024-09-20 ENCOUNTER — VIRTUAL VISIT (OUTPATIENT)
Dept: UROLOGY | Facility: CLINIC | Age: 64
End: 2024-09-20
Payer: COMMERCIAL

## 2024-09-20 ENCOUNTER — TELEPHONE (OUTPATIENT)
Dept: UROLOGY | Facility: CLINIC | Age: 64
End: 2024-09-20

## 2024-09-20 DIAGNOSIS — R97.20 ELEVATED PROSTATE SPECIFIC ANTIGEN (PSA): ICD-10-CM

## 2024-09-20 DIAGNOSIS — C61 PROSTATE CANCER (H): Primary | ICD-10-CM

## 2024-09-20 PROCEDURE — 99213 OFFICE O/P EST LOW 20 MIN: CPT | Mod: 95 | Performed by: STUDENT IN AN ORGANIZED HEALTH CARE EDUCATION/TRAINING PROGRAM

## 2024-09-20 NOTE — TELEPHONE ENCOUNTER
Patient confirmed scheduled appointment:  Date: March 7th  Time: 11:15am  Visit type: Return   Provider: Shamar Kulkarni   Location: [facility]  Testing/imaging: PSA scheduled 3/4   Additional notes: per check out -  Repeat PSA in 5-6 months with follow-up virtual visit with Heath Kulkarni PA-C shortly afterward.

## 2024-09-20 NOTE — LETTER
9/20/2024       RE: Reid Monaco  [address] [place] [address]     Dear Colleague,    Thank you for referring your patient, Reid Monaco, to the [facility] [place] [facility]. Please see a copy of my visit note below.    Virtual Visit Details    Type of service:  Video Visit   Video Start Time: 8:43 AM  Video End Time:8:52 AM    Originating Location (pt. Location): Home    Distant Location (provider location):  Off-site  Platform used for Video Visit: [facility]    Visit conducted via real-time audio/video technology by Heath Kulkarni PA-C to the patient in their home.    Subjective     REASON FOR VISIT  [place] 6 prostate cancer follow-up    HISTORY OF PRESENT ILLNESS  Mr. Monaco is a 64 year old male who I am speaking with today in regards to his recently discovered [place] 6 prostate cancer.  Biopsy diagnosing this took place on 1/12/2024 after a PSA elevation up to 10 and a MRI showing a PI-RADS 4 lesion.  Pathology showed 1 core of [place] 6 prostate cancer with less than 5% tumor involvement.  After reviewing the pathology results, opted for active surveillance.    Objective     PHYSICAL EXAMINATION  Deferred given virtual visit.    LABS  Lab Results   Component Value Date    PSA 5.95 (H) 07/30/2024    PSA 5.07 (H) 05/01/2024    PSA 10.00 (H) 10/31/2023    PSA 2.44 10/10/2022    PSA 2.00 09/14/2021    PSA 2.34 09/09/2020    PSA 2.35 04/03/2019    PSA 1.38 03/30/2018      Prostate biopsy from 1/12/2024:  Final Diagnosis   A.  Prostate gland, left  base, needle biopsies X2:  - Negative for malignancy.     B.  Prostate gland, left  mid, needle biopsies X2:  - Prostatic acinar adenocarcinoma.      - [place] score:  6 (3+3)     - Proportion of tissue involved by tumor:  <5%     - Number of tissue cores involved by tumor:  1 (out of 2)     - Angiolymphatic invasion:  Not identified     - Perineural invasion:  Not  identified     C.  Prostate gland, left  apex, needle biopsies X2:  - Negative for malignancy.     D.  Prostate gland, right base, needle biopsies X2:  - Negative for malignancy.  - Focal mild chronic inflammation.     E.  Prostate gland, right mid, needle biopsies X2:  - Negative for malignancy.  - A small fragment of normal colonic mucosa.     F.  Prostate gland, right apex, needle biopsies X2:  - Negative for malignancy.  - Focal mild chronic inflammation.     G.  Prostate gland, target lesion, needle biopsies X2:  - Negative for malignancy.     IMAGING  Most recent prostate MRI from 12/23/2023    Narrative & Impression   MRI PROSTATE: 12/10/2023 1:38 PM     CLINICAL HISTORY: Elevated prostate specific antigen (PSA)     Most Recent PSA: 10.0 ug/L on 10/31/2023     Comparison: none.     TECHNIQUE:  The following sequences were obtained: High-resolution axial  T2-weighted, coronal T2-weighted, 3D volumetric T2-weighted, axial  pre-contrast T1, axial diffusion-weighted, axial apparent diffusion  coefficient and axial dynamic contrast-enhanced T1. Postcontrast  images were evaluated on a separate workstation to evaluate dynamic  contrast enhancement. The technique of this exam is PI-RADS v2.1  compliant. Contrast dose: 8.5 ml miko     FINDINGS:  Size: 5.1 x 3.6 x 4.0 cm, 38 grams  Hemorrhage: Absent  Peripheral zone: Heterogeneous on T2-weighted images. Suspicious  lesions as detailed below.  Transition zone: Enlarged with BPH changes. Transition zone nodules  which are circumscribed or mostly encapsulated without diffusion  restriction.  PI-RADS 2.  No highly suspicious nodules.     Lesion(s) in rank order of severity (highest score- to lowest score,  then by size)      Lesion 1:  Location: Right mid gland peripheral zone at the 7 o'clock position  relative to the urethra. Series 7 image 53.  Size: 10 mm  T2 description: Ill-defined hypodense  T2 numerical assessment: 3  DWI description: Mild diffusion  restriction  DWI numerical assessment: 3  DCE assessment: Positive    Prostate margin: Capsular abutment 6-15 mm  Lesion overall PI-RADS category: 4     Neurovascular bundles: No neurovascular bundle involvement by  malignancy.  Seminal vesicles: No seminal vesicle involvement by malignancy.  Lymph nodes: No lymph node involvement  Bones: No suspicious lesions  Other pelvic organs: No additional findings.                                                                         IMPRESSION:  1. Based on the most suspicious abnormality, this exam is  characterized as PIRADS 4 - Clinically significant cancer is likely to  be present.  The most suspicious abnormality is located at the right  posterior mid gland peripheral zone and there is short segment  capsular abutment with low likelihood of minimal extraprostatic  extension.  2. No suspicious adenopathy or evidence of pelvic metastases.     Assessment & Plan   [place] 6 prostate cancer on active surveillance, initial diagnosis in January 2024    It was my pleasure to speak with Carlton in follow-up for his history of [place] 6 prostate cancer  currently on active surveillance with initial diagnosis in January 2024.  After reviewing his clinical history, we discussed the tenets of active surveillance again, specifically that we will plan to get a PSA every 6 months, get a confirmatory biopsy in 18 to 24 months, and then try to de-escalate biopsies, but continue with the frequent PSAs and occasional prostate MRIs.  For now we will plan to see him back in 5 to 6 months with a repeat PSA with a follow-up visit with me shortly afterwards.  At that visit we will discuss a repeat PSA and MRI 6 months after that with a plan to schedule a biopsy thereafter.    Mr. Monaco expressed understanding and agreement to the above discussion and plan and all of his questions were answered to his satisfaction.     PLAN  Repeat PSA in 6 months with follow-up virtual visit with me to discuss  results    SIGNED    Heath Kulkarni PA-C      I spent a total of 13 minutes spent on the date of the encounter doing chart review, history and exam, documentation, and further activities as noted above.      Again, thank you for allowing me to participate in the care of your patient.      Sincerely,    Heath Kulkarni PA-C

## 2024-09-20 NOTE — NURSING NOTE
Current patient location: [place]    Is the patient currently in the state of [place]? YES    Visit mode:VIDEO    If the visit is dropped, the patient can be reconnected by: VIDEO VISIT: Text to cell phone:   Telephone Information:   Mobile [phone]       Will anyone else be joining the visit? NO  (If patient encounters technical issues they should call [phone]    How would you like to obtain your AVS? MyChart    Are changes needed to the allergy or medication list? No  Carlton reported no changes to e-check in information for visit. VF did not review e-check in information again with Carlton due to this.       Are refills needed on medications prescribed by this physician? NO    Rooming Documentation:  Not applicable      Reason for visit: RECHECK    Jackie SALMERON

## 2024-11-26 ENCOUNTER — OFFICE VISIT (OUTPATIENT)
Dept: FAMILY MEDICINE | Facility: CLINIC | Age: 64
End: 2024-11-26
Payer: COMMERCIAL

## 2024-11-26 VITALS
RESPIRATION RATE: 12 BRPM | SYSTOLIC BLOOD PRESSURE: 122 MMHG | BODY MASS INDEX: 29.62 KG/M2 | HEIGHT: 69 IN | TEMPERATURE: 97.9 F | WEIGHT: 200 LBS | HEART RATE: 49 BPM | DIASTOLIC BLOOD PRESSURE: 78 MMHG | OXYGEN SATURATION: 98 %

## 2024-11-26 DIAGNOSIS — Z13.220 SCREENING FOR HYPERLIPIDEMIA: ICD-10-CM

## 2024-11-26 DIAGNOSIS — Z13.1 SCREENING FOR DIABETES MELLITUS: Primary | ICD-10-CM

## 2024-11-26 DIAGNOSIS — Z00.00 ROUTINE GENERAL MEDICAL EXAMINATION AT A HEALTH CARE FACILITY: Primary | ICD-10-CM

## 2024-11-26 DIAGNOSIS — E78.1 HYPERTRIGLYCERIDEMIA: ICD-10-CM

## 2024-11-26 DIAGNOSIS — Z00.00 ROUTINE GENERAL MEDICAL EXAMINATION AT A HEALTH CARE FACILITY: ICD-10-CM

## 2024-11-26 DIAGNOSIS — Z13.1 SCREENING FOR DIABETES MELLITUS: ICD-10-CM

## 2024-11-26 LAB
ALBUMIN SERPL BCG-MCNC: 4.4 G/DL (ref 3.5–5.2)
ALP SERPL-CCNC: 55 U/L (ref 40–150)
ALT SERPL W P-5'-P-CCNC: 34 U/L (ref 0–70)
ANION GAP SERPL CALCULATED.3IONS-SCNC: 11 MMOL/L (ref 7–15)
AST SERPL W P-5'-P-CCNC: 31 U/L (ref 0–45)
BILIRUB SERPL-MCNC: 0.4 MG/DL
BUN SERPL-MCNC: 15.9 MG/DL (ref 8–23)
CALCIUM SERPL-MCNC: 9.6 MG/DL (ref 8.8–10.4)
CHLORIDE SERPL-SCNC: 104 MMOL/L (ref 98–107)
CHOLEST SERPL-MCNC: 211 MG/DL
CREAT SERPL-MCNC: 0.87 MG/DL (ref 0.67–1.17)
EGFRCR SERPLBLD CKD-EPI 2021: >90 ML/MIN/1.73M2
FASTING STATUS PATIENT QL REPORTED: YES
GLUCOSE SERPL-MCNC: 99 MG/DL (ref 70–99)
HCO3 SERPL-SCNC: 24 MMOL/L (ref 22–29)
HDLC SERPL-MCNC: 41 MG/DL
LDLC SERPL CALC-MCNC: 114 MG/DL
NONHDLC SERPL-MCNC: 170 MG/DL
POTASSIUM SERPL-SCNC: 5.2 MMOL/L (ref 3.4–5.3)
PROT SERPL-MCNC: 7.1 G/DL (ref 6.4–8.3)
SODIUM SERPL-SCNC: 139 MMOL/L (ref 135–145)
TRIGL SERPL-MCNC: 278 MG/DL

## 2024-11-26 PROCEDURE — 99396 PREV VISIT EST AGE 40-64: CPT | Performed by: FAMILY MEDICINE

## 2024-11-26 PROCEDURE — 80053 COMPREHEN METABOLIC PANEL: CPT | Performed by: FAMILY MEDICINE

## 2024-11-26 PROCEDURE — 80061 LIPID PANEL: CPT | Performed by: FAMILY MEDICINE

## 2024-11-26 PROCEDURE — 36415 COLL VENOUS BLD VENIPUNCTURE: CPT | Performed by: FAMILY MEDICINE

## 2024-11-26 SDOH — HEALTH STABILITY: PHYSICAL HEALTH: ON AVERAGE, HOW MANY MINUTES DO YOU ENGAGE IN EXERCISE AT THIS LEVEL?: 60 MIN

## 2024-11-26 SDOH — HEALTH STABILITY: PHYSICAL HEALTH: ON AVERAGE, HOW MANY DAYS PER WEEK DO YOU ENGAGE IN MODERATE TO STRENUOUS EXERCISE (LIKE A BRISK WALK)?: 7 DAYS

## 2024-11-26 ASSESSMENT — SOCIAL DETERMINANTS OF HEALTH (SDOH): HOW OFTEN DO YOU GET TOGETHER WITH FRIENDS OR RELATIVES?: THREE TIMES A WEEK

## 2024-11-26 ASSESSMENT — PAIN SCALES - GENERAL: PAINLEVEL_OUTOF10: NO PAIN (0)

## 2024-11-26 NOTE — PATIENT INSTRUCTIONS
Patient Education   Preventive Care Advice   This is general advice given by our system to help you stay healthy. However, your care team may have specific advice just for you. Please talk to your care team about your preventive care needs.  Nutrition  Eat 5 or more servings of fruits and vegetables each day.  Try wheat bread, brown rice and whole grain pasta (instead of white bread, rice, and pasta).  Get enough calcium and vitamin D. Check the label on foods and aim for 100% of the RDA (recommended daily allowance).  Lifestyle  Exercise at least 150 minutes each week  (30 minutes a day, 5 days a week).  Do muscle strengthening activities 2 days a week. These help control your weight and prevent disease.  No smoking.  Wear sunscreen to prevent skin cancer.  Have a dental exam and cleaning every 6 months.  Yearly exams  See your health care team every year to talk about:  Any changes in your health.  Any medicines your care team has prescribed.  Preventive care, family planning, and ways to prevent chronic diseases.  Shots (vaccines)   HPV shots (up to age 26), if you've never had them before.  Hepatitis B shots (up to age 59), if you've never had them before.  COVID-19 shot: Get this shot when it's due.  Flu shot: Get a flu shot every year.  Tetanus shot: Get a tetanus shot every 10 years.  Pneumococcal, hepatitis A, and RSV shots: Ask your care team if you need these based on your risk.  Shingles shot (for age 50 and up)  General health tests  Diabetes screening:  Starting at age 35, Get screened for diabetes at least every 3 years.  If you are younger than age 35, ask your care team if you should be screened for diabetes.  Cholesterol test: At age 39, start having a cholesterol test every 5 years, or more often if advised.  Bone density scan (DEXA): At age 50, ask your care team if you should have this scan for osteoporosis (brittle bones).  Hepatitis C: Get tested at least once in your life.  STIs (sexually  transmitted infections)  Before age 24: Ask your care team if you should be screened for STIs.  After age 24: Get screened for STIs if you're at risk. You are at risk for STIs (including HIV) if:  You are sexually active with more than one person.  You don't use condoms every time.  You or a partner was diagnosed with a sexually transmitted infection.  If you are at risk for HIV, ask about PrEP medicine to prevent HIV.  Get tested for HIV at least once in your life, whether you are at risk for HIV or not.  Cancer screening tests  Cervical cancer screening: If you have a cervix, begin getting regular cervical cancer screening tests starting at age 21.  Breast cancer scan (mammogram): If you've ever had breasts, begin having regular mammograms starting at age 40. This is a scan to check for breast cancer.  Colon cancer screening: It is important to start screening for colon cancer at age 45.  Have a colonoscopy test every 10 years (or more often if you're at risk) Or, ask your provider about stool tests like a FIT test every year or Cologuard test every 3 years.  To learn more about your testing options, visit:   .  For help making a decision, visit:   https://bit.ly/na44382.  Prostate cancer screening test: If you have a prostate, ask your care team if a prostate cancer screening test (PSA) at age 55 is right for you.  Lung cancer screening: If you are a current or former smoker ages 50 to 80, ask your care team if ongoing lung cancer screenings are right for you.  For informational purposes only. Not to replace the advice of your health care provider. Copyright   2023 [facility]. All rights reserved. Clinically reviewed by the [facility] Transitions Program. [employer] [id number] - REV 01/24.  Learning About Stress  What is stress?     Stress is your body's response to a hard situation. Your body can have a physical, emotional, or mental response. Stress is a fact of life for most people, and it  affects everyone differently. What causes stress for you may not be stressful for someone else.  A lot of things can cause stress. You may feel stress when you go on a job interview, take a test, or run a race. This kind of short-term stress is normal and even useful. It can help you if you need to work hard or react quickly. For example, stress can help you finish an important job on time.  Long-term stress is caused by ongoing stressful situations or events. Examples of long-term stress include long-term health problems, ongoing problems at work, or conflicts in your family. Long-term stress can harm your health.  How does stress affect your health?  When you are stressed, your body responds as though you are in danger. It makes hormones that speed up your heart, make you breathe faster, and give you a burst of energy. This is called the fight-or-flight stress response. If the stress is over quickly, your body goes back to normal and no harm is done.  But if stress happens too often or lasts too long, it can have bad effects. Long-term stress can make you more likely to get sick, and it can make symptoms of some diseases worse. If you tense up when you are stressed, you may develop neck, shoulder, or low back pain. Stress is linked to high blood pressure and heart disease.  Stress also harms your emotional health. It can make you santiago, tense, or depressed. Your relationships may suffer, and you may not do well at work or school.  What can you do to manage stress?  You can try these things to help manage stress:   Do something active. Exercise or activity can help reduce stress. Walking is a great way to get started. Even everyday activities such as housecleaning or yard work can help.  Try yoga or arin chi. These techniques combine exercise and meditation. You may need some training at first to learn them.  Do something you enjoy. For example, listen to music or go to a movie. Practice your hobby or do volunteer  "work.  Meditate. This can help you relax, because you are not worrying about what happened before or what may happen in the future.  Do guided imagery. Imagine yourself in any setting that helps you feel calm. You can use online videos, books, or a teacher to guide you.  Do breathing exercises. For example:  From a standing position, bend forward from the waist with your knees slightly bent. Let your arms dangle close to the floor.  Breathe in slowly and deeply as you return to a standing position. Roll up slowly and lift your head last.  Hold your breath for just a few seconds in the standing position.  Breathe out slowly and bend forward from the waist.  Let your feelings out. Talk, laugh, cry, and express anger when you need to. Talking with supportive friends or family, a counselor, or a chasidy leader about your feelings is a healthy way to relieve stress. Avoid discussing your feelings with people who make you feel worse.  Write. It may help to write about things that are bothering you. This helps you find out how much stress you feel and what is causing it. When you know this, you can find better ways to cope.  What can you do to prevent stress?  You might try some of these things to help prevent stress:  Manage your time. This helps you find time to do the things you want and need to do.  Get enough sleep. Your body recovers from the stresses of the day while you are sleeping.  Get support. Your family, friends, and community can make a difference in how you experience stress.  Limit your news feed. Avoid or limit time on social media or news that may make you feel stressed.  Do something active. Exercise or activity can help reduce stress. Walking is a great way to get started.  Where can you learn more?  Go to https://www."[employer]".net/patiented  Enter N032 in the search box to learn more about \"Learning About Stress.\"  Current as of: October 24, 2023  Content Version: 14.2 2024 [employer]. "   Care instructions adapted under license by your healthcare professional. If you have questions about a medical condition or this instruction, always ask your healthcare professional. [employer] disclaims any warranty or liability for your use of this information.

## 2025-01-16 NOTE — PROGRESS NOTES
Preventive Care Visit  [facility] [place]  Fady Castro DO, Family Medicine  Nov 26, 2024  {Provider  Link to SmartSet :358556}    {PROVIDER CHARTING PREFERENCE:892002}    Escobar Vincent is a 64 year old, presenting for the following:  Physical        11/26/2024     8:53 AM   Additional Questions   Roomed by TALA White CMA   Accompanied by SELF        HPI  ***    Hyperlipidemia Follow-Up    Are you regularly taking any medication or supplement to lower your cholesterol?   Yes- Fenofibrate 54mg  Are you having muscle aches or other side effects that you think could be caused by your cholesterol lowering medication?  No      Health Care Directive  Patient does not have a Health Care Directive: Advance Directive received and scanned. Click on Code in the patient header to view.      11/26/2024   General Health   How would you rate your overall physical health? Good   Feel stress (tense, anxious, or unable to sleep) To some extent      (!) STRESS CONCERN      11/26/2024   Nutrition   Three or more servings of calcium each day? Yes   Diet: Regular (no restrictions) - eating more convenient food   How many servings of fruit and vegetables per day? (!) 2-3   How many sweetened beverages each day? 0-1            11/26/2024   Exercise   Days per week of moderate/strenous exercise 7 days   Average minutes spent exercising at this level 60 min            11/26/2024   Social Factors   Frequency of gathering with friends or relatives Three times a week   Worry food won't last until get money to buy more No   Food not last or not have enough money for food? No   Do you have housing? (Housing is defined as stable permanent housing and does not include staying ouside in a car, in a tent, in an abandoned building, in an overnight shelter, or couch-surfing.) Yes   Are you worried about losing your housing? No   Lack of transportation? No   Unable to get utilities (heat,electricity)? No            11/26/2024    Fall Risk   Fallen 2 or more times in the past year? No    Trouble with walking or balance? No        Patient-reported          2024   Dental   Dentist two times every year? Yes            2024   TB Screening   Were you born outside of the [place]? No        Today's PHQ-2 Score:       2024     8:54 AM   PHQ-2 (  [employer])   Q1: Little interest or pleasure in doing things 0   Q2: Feeling down, depressed or hopeless 0   PHQ-2 Score 0           2024   Substance Use   Alcohol more than 3/day or more than 7/wk No   Do you use any other substances recreationally? (!) CANNABIS PRODUCTS        Social History     Tobacco Use    Smoking status: Former     Current packs/day: 0.00     Average packs/day: 0.5 packs/day for 5.0 years (2.5 ttl pk-yrs)     Types: Cigarettes     Start date: 1980     Quit date: 1985     Years since quittin.9     Passive exposure: Past    Smokeless tobacco: Never    Tobacco comments:     intermittent smoker over 4 years, 40 YEARS AGO   Vaping Use    Vaping status: Never Used   Substance Use Topics    Alcohol use: Yes     Alcohol/week: 1.7 standard drinks of alcohol     Comment: approx 1 drink per week    Drug use: Yes     Types: Marijuana     Comment: THC gummy           2024   STI Screening   New sexual partner(s) since last STI/HIV test? No      Last PSA:   [facility] PSA   Date Value Ref Range Status   2014 0.6 < OR = 4.0 ng/mL Final     Comment:        This test was performed using the Siemens  chemiluminescent method. Values obtained from  different assay methods cannot be used  interchangeably. PSA levels, regardless of  value, should not be interpreted as absolute  evidence of the presence or absence of disease.        PSA   Date Value Ref Range Status   2020 2.34 0 - 4 ug/L Final     Comment:     Assay Method:  Chemiluminescence using Siemens Vista analyzer     Prostate Specific Antigen Screen   Date Value Ref Range Status   10/10/2022 2.44 0.00  "- 4.00 ug/L Final     PSA Tumor Marker   Date Value Ref Range Status   07/30/2024 5.95 (H) 0.00 - 4.50 ng/mL Final     ASCVD Risk   The 10-year ASCVD risk score (Wilian PATTON, et al., 2019) is: 12.4%    Values used to calculate the score:      Age: 64 years      Sex: Male      Is Non- : No      Diabetic: No      Tobacco smoker: No      Systolic Blood Pressure: 122 mmHg      Is BP treated: No      HDL Cholesterol: 38 mg/dL      Total Cholesterol: 191 mg/dL      Reviewed and updated as needed this visit by Provider   Tobacco  Allergies  Meds  Problems  Med Hx  Surg Hx  Fam Hx            Past Medical History:   Diagnosis Date    Family history of GI malignancy 6/29/2010    Father - colon cancer 61     History of colonic polyps 4/20/2017    Hypertriglyceridemia 5/14/2009     Past Surgical History:   Procedure Laterality Date    BIOPSY  2020    anal tag removed    MRI BIOPSY PROSTATE Bilateral 01/12/2024    [facility]    SOFT TISSUE SURGERY  2010    remove torn miniscus    VASECTOMY  10/2008    ZZHC COLONOSCOPY THRU STOMA, DIAGNOSTIC  2010    normal         Review of Systems  Constitutional, HEENT, cardiovascular, pulmonary, gi and gu systems are negative, except as otherwise noted.       Objective    Exam  /78   Pulse (!) 49   Temp 97.9  F (36.6  C) (Tympanic)   Resp 12   Ht 1.753 m (5' 9\")   Wt 90.7 kg (200 lb)   SpO2 98%   BMI 29.53 kg/m     Estimated body mass index is 29.53 kg/m  as calculated from the following:    Height as of this encounter: 1.753 m (5' 9\").    Weight as of this encounter: 90.7 kg (200 lb).    Physical Exam  {Exam Choices (Optional):806004}        Signed Electronically by: Fady Castro DO  {Email feedback regarding this note to [email or web address]   :[id number]}  " "Hypertriglyceridemia 5/14/2009     Past Surgical History:   Procedure Laterality Date    BIOPSY  2020    anal tag removed    MRI BIOPSY PROSTATE Bilateral 01/12/2024    [facility]    SOFT TISSUE SURGERY  2010    remove torn miniscus    VASECTOMY  10/2008    ZZ COLONOSCOPY THRU STOMA, DIAGNOSTIC  2010    normal         Review of Systems  Constitutional, HEENT, cardiovascular, pulmonary, gi and gu systems are negative, except as otherwise noted.       Objective    Exam  /78   Pulse (!) 49   Temp 97.9  F (36.6  C) (Tympanic)   Resp 12   Ht 1.753 m (5' 9\")   Wt 90.7 kg (200 lb)   SpO2 98%   BMI 29.53 kg/m     Estimated body mass index is 29.53 kg/m  as calculated from the following:    Height as of this encounter: 1.753 m (5' 9\").    Weight as of this encounter: 90.7 kg (200 lb).    Physical Exam  GENERAL: alert and no distress  EYES: Eyes grossly normal to inspection  HENT: ear canals and TM's normal, nose and mouth without ulcers or lesions  NECK: no adenopathy, no asymmetry, masses, or scars  RESP: lungs clear to auscultation - no rales, rhonchi or wheezes  CV: regular rates and rhythm, normal S1 S2, no S3 or S4, and no murmur, click or rub  SKIN: no suspicious lesions or rashes  PSYCH: mentation appears normal, affect normal/bright        Signed Electronically by: Fady Castro DO    " Quality 226: Preventive Care And Screening: Tobacco Use: Screening And Cessation Intervention: Patient screened for tobacco use and is an ex/non-smoker Detail Level: Detailed

## 2025-03-04 ENCOUNTER — LAB (OUTPATIENT)
Dept: LAB | Facility: CLINIC | Age: 65
End: 2025-03-04
Payer: COMMERCIAL

## 2025-03-04 DIAGNOSIS — R97.20 ELEVATED PROSTATE SPECIFIC ANTIGEN (PSA): ICD-10-CM

## 2025-03-04 DIAGNOSIS — C61 PROSTATE CANCER (H): ICD-10-CM

## 2025-03-04 LAB — PSA SERPL DL<=0.01 NG/ML-MCNC: 5.56 NG/ML (ref 0–4.5)

## 2025-03-04 PROCEDURE — 36415 COLL VENOUS BLD VENIPUNCTURE: CPT

## 2025-03-04 PROCEDURE — 84153 ASSAY OF PSA TOTAL: CPT

## 2025-03-10 ENCOUNTER — ALLIED HEALTH/NURSE VISIT (OUTPATIENT)
Dept: INTERNAL MEDICINE | Facility: CLINIC | Age: 65
End: 2025-03-10
Payer: COMMERCIAL

## 2025-03-10 DIAGNOSIS — Z23 NEED FOR VACCINATION: Primary | ICD-10-CM

## 2025-03-10 PROCEDURE — 99207 PR NO CHARGE NURSE ONLY: CPT

## 2025-03-10 PROCEDURE — 90677 PCV20 VACCINE IM: CPT

## 2025-03-10 PROCEDURE — G0009 ADMIN PNEUMOCOCCAL VACCINE: HCPCS

## 2025-03-10 NOTE — PROGRESS NOTES
Prior to immunization administration, verified patients identity using patient s name and date of birth. Please see Immunization Activity for additional information.     Screening Questionnaire for Adult Immunization    Are you sick today?   No   Do you have allergies to medications, food, a vaccine component or latex?   No   Have you ever had a serious reaction after receiving a vaccination?   No   Do you have a long-term health problem with heart, lung, kidney, or metabolic disease (e.g., diabetes), asthma, a blood disorder, no spleen, complement component deficiency, a cochlear implant, or a spinal fluid leak?  Are you on long-term aspirin therapy?   No   Do you have cancer, leukemia, HIV/AIDS, or any other immune system problem?   No   Do you have a parent, brother, or sister with an immune system problem?   No   In the past 3 months, have you taken medications that affect  your immune system, such as prednisone, other steroids, or anticancer drugs; drugs for the treatment of rheumatoid arthritis, Crohn s disease, or psoriasis; or have you had radiation treatments?   No   Have you had a seizure, or a brain or other nervous system problem?   No   During the past year, have you received a transfusion of blood or blood    products, or been given immune (gamma) globulin or antiviral drug?   No   For women: Are you pregnant or is there a chance you could become       pregnant during the next month?   No   Have you received any vaccinations in the past 4 weeks?   No     Immunization questionnaire answers were all negative.    I have reviewed the following standing orders: This patient is due and qualifies for the Pneumococcal vaccine.    Click here for Pneumococcal (Adult) Standing Order    I have reviewed the vaccines inclusion and exclusion criteria;No concerns regarding eligibility.     Patient instructed to remain in clinic for 15 minutes afterwards, and to report any adverse reactions.     Screening performed by  Gerda Lopez, LPN on 3/10/2025 at 1:49 PM.

## 2025-07-17 ENCOUNTER — LAB (OUTPATIENT)
Dept: LAB | Facility: CLINIC | Age: 65
End: 2025-07-17
Payer: COMMERCIAL

## 2025-07-17 DIAGNOSIS — E78.1 HYPERTRIGLYCERIDEMIA: ICD-10-CM

## 2025-07-17 LAB
ALT SERPL W P-5'-P-CCNC: 14 U/L (ref 0–70)
CHOLEST SERPL-MCNC: 114 MG/DL
FASTING STATUS PATIENT QL REPORTED: YES
HDLC SERPL-MCNC: 36 MG/DL
LDLC SERPL CALC-MCNC: 60 MG/DL
NONHDLC SERPL-MCNC: 78 MG/DL
TRIGL SERPL-MCNC: 91 MG/DL

## 2025-08-11 ENCOUNTER — PATIENT OUTREACH (OUTPATIENT)
Dept: GASTROENTEROLOGY | Facility: CLINIC | Age: 65
End: 2025-08-11
Payer: COMMERCIAL

## 2025-08-11 DIAGNOSIS — Z12.11 SPECIAL SCREENING FOR MALIGNANT NEOPLASMS, COLON: Primary | ICD-10-CM

## 2025-08-11 PROBLEM — D12.6 TUBULAR ADENOMA OF COLON: Status: ACTIVE | Noted: 2025-08-11

## 2025-08-14 ENCOUNTER — TELEPHONE (OUTPATIENT)
Dept: GASTROENTEROLOGY | Facility: CLINIC | Age: 65
End: 2025-08-14
Payer: COMMERCIAL